# Patient Record
Sex: FEMALE | Race: WHITE | NOT HISPANIC OR LATINO | Employment: FULL TIME | URBAN - METROPOLITAN AREA
[De-identification: names, ages, dates, MRNs, and addresses within clinical notes are randomized per-mention and may not be internally consistent; named-entity substitution may affect disease eponyms.]

---

## 2017-02-08 LAB
EXTERNAL ABO GROUPING: NORMAL
EXTERNAL ANTIBODY SCREEN: NEGATIVE
EXTERNAL GTT 1 HOUR: 78
EXTERNAL HEPATITIS B SURFACE ANTIGEN: NEGATIVE
EXTERNAL HEPATITIS C AB: NORMAL
EXTERNAL RH FACTOR: POSITIVE
EXTERNAL RUBELLA QUALITATIVE: NORMAL
EXTERNAL SYPHILIS RPR SCREEN: NORMAL
HIV1 AB SPEC QL IA.RAPID: NEGATIVE

## 2017-03-31 ENCOUNTER — TRANSCRIBE ORDERS (OUTPATIENT)
Dept: ADMINISTRATIVE | Facility: HOSPITAL | Age: 37
End: 2017-03-31

## 2017-03-31 DIAGNOSIS — O09.512 AMA (ADVANCED MATERNAL AGE) PRIMIGRAVIDA 35+, SECOND TRIMESTER: Primary | ICD-10-CM

## 2017-03-31 DIAGNOSIS — Z82.79 FHX: DOWN SYNDROME: ICD-10-CM

## 2017-04-04 RX ORDER — LEVOTHYROXINE SODIUM 0.05 MG/1
TABLET ORAL
Qty: 90 TABLET | Refills: 1 | Status: SHIPPED | OUTPATIENT
Start: 2017-04-04 | End: 2018-02-09 | Stop reason: SDUPTHER

## 2017-05-03 ENCOUNTER — HOSPITAL ENCOUNTER (OUTPATIENT)
Dept: ULTRASOUND IMAGING | Facility: HOSPITAL | Age: 37
Discharge: HOME | End: 2017-05-03
Attending: OBSTETRICS & GYNECOLOGY | Admitting: OBSTETRICS & GYNECOLOGY

## 2017-05-03 DIAGNOSIS — Z82.79 FHX: DOWN SYNDROME: ICD-10-CM

## 2017-05-03 DIAGNOSIS — O09.512 AMA (ADVANCED MATERNAL AGE) PRIMIGRAVIDA 35+, SECOND TRIMESTER: ICD-10-CM

## 2017-05-03 PROCEDURE — 76817 TRANSVAGINAL US OBSTETRIC: CPT

## 2017-05-03 PROCEDURE — 76811 OB US DETAILED SNGL FETUS: CPT

## 2017-08-25 LAB — EXTERNAL GROUP B STREP ANTIGEN: NORMAL

## 2017-09-27 ENCOUNTER — ANESTHESIA (OUTPATIENT)
Dept: LABOR AND DELIVERY | Facility: HOSPITAL | Age: 37
End: 2017-09-27

## 2017-09-27 ENCOUNTER — ANESTHESIA EVENT (OUTPATIENT)
Dept: LABOR AND DELIVERY | Facility: HOSPITAL | Age: 37
End: 2017-09-27

## 2017-09-27 ENCOUNTER — HOSPITAL ENCOUNTER (INPATIENT)
Dept: LABOR AND DELIVERY | Facility: HOSPITAL | Age: 37
LOS: 2 days | Discharge: HOME | End: 2017-09-29
Attending: OBSTETRICS & GYNECOLOGY | Admitting: OBSTETRICS & GYNECOLOGY

## 2017-09-27 PROBLEM — Z34.90 PREGNANCY: Status: ACTIVE | Noted: 2017-09-27

## 2017-09-27 LAB
ABO GROUP BLD: NORMAL
ATMOSPHERIC PRESS: 747.8 MMHG
BASE EXCESS BLDCOA CALC-SCNC: -0.8 MMOL/L
BASOPHILS # BLD AUTO: 0.01 10*3/MM3 (ref 0–0.2)
BASOPHILS NFR BLD AUTO: 0.1 % (ref 0–1.5)
BDY SITE: ABNORMAL
BLD GP AB SCN SERPL QL: NEGATIVE
DEPRECATED RDW RBC AUTO: 47.1 FL (ref 37–54)
EOSINOPHIL # BLD AUTO: 0.06 10*3/MM3 (ref 0–0.7)
EOSINOPHIL NFR BLD AUTO: 0.8 % (ref 0.3–6.2)
ERYTHROCYTE [DISTWIDTH] IN BLOOD BY AUTOMATED COUNT: 12.9 % (ref 11.7–13)
HCO3 BLDCOA-SCNC: 29.1 MMOL/L (ref 22–28)
HCT VFR BLD AUTO: 39.9 % (ref 35.6–45.5)
HGB BLD-MCNC: 13.5 G/DL (ref 11.9–15.5)
IMM GRANULOCYTES # BLD: 0.03 10*3/MM3 (ref 0–0.03)
IMM GRANULOCYTES NFR BLD: 0.4 % (ref 0–0.5)
LYMPHOCYTES # BLD AUTO: 1.63 10*3/MM3 (ref 0.9–4.8)
LYMPHOCYTES NFR BLD AUTO: 21.3 % (ref 19.6–45.3)
MCH RBC QN AUTO: 33.6 PG (ref 26.9–32)
MCHC RBC AUTO-ENTMCNC: 33.8 G/DL (ref 32.4–36.3)
MCV RBC AUTO: 99.3 FL (ref 80.5–98.2)
MODALITY: ABNORMAL
MONOCYTES # BLD AUTO: 0.7 10*3/MM3 (ref 0.2–1.2)
MONOCYTES NFR BLD AUTO: 9.1 % (ref 5–12)
NEUTROPHILS # BLD AUTO: 5.23 10*3/MM3 (ref 1.9–8.1)
NEUTROPHILS NFR BLD AUTO: 68.3 % (ref 42.7–76)
PCO2 BLDCOA: 67.9 MMHG
PH BLDCOA: 7.24 PH UNITS (ref 7.18–7.34)
PLATELET # BLD AUTO: 146 10*3/MM3 (ref 140–500)
PMV BLD AUTO: 10.4 FL (ref 6–12)
PO2 BLDCOA: <25.2 MMHG (ref 12–26)
RBC # BLD AUTO: 4.02 10*6/MM3 (ref 3.9–5.2)
RH BLD: POSITIVE
SAO2 % BLDCOA: 9.8 % (ref 92–99)
WBC NRBC COR # BLD: 7.66 10*3/MM3 (ref 4.5–10.7)

## 2017-09-27 PROCEDURE — 3E0E3GC INTRODUCTION OF OTHER THERAPEUTIC SUBSTANCE INTO PRODUCTS OF CONCEPTION, PERCUTANEOUS APPROACH: ICD-10-PCS | Performed by: OBSTETRICS & GYNECOLOGY

## 2017-09-27 PROCEDURE — 86900 BLOOD TYPING SEROLOGIC ABO: CPT | Performed by: OBSTETRICS & GYNECOLOGY

## 2017-09-27 PROCEDURE — 10907ZC DRAINAGE OF AMNIOTIC FLUID, THERAPEUTIC FROM PRODUCTS OF CONCEPTION, VIA NATURAL OR ARTIFICIAL OPENING: ICD-10-PCS | Performed by: OBSTETRICS & GYNECOLOGY

## 2017-09-27 PROCEDURE — C1755 CATHETER, INTRASPINAL: HCPCS | Performed by: ANESTHESIOLOGY

## 2017-09-27 PROCEDURE — 86901 BLOOD TYPING SEROLOGIC RH(D): CPT | Performed by: OBSTETRICS & GYNECOLOGY

## 2017-09-27 PROCEDURE — 86850 RBC ANTIBODY SCREEN: CPT | Performed by: OBSTETRICS & GYNECOLOGY

## 2017-09-27 PROCEDURE — 82803 BLOOD GASES ANY COMBINATION: CPT

## 2017-09-27 PROCEDURE — 85025 COMPLETE CBC W/AUTO DIFF WBC: CPT | Performed by: OBSTETRICS & GYNECOLOGY

## 2017-09-27 PROCEDURE — 0KQM0ZZ REPAIR PERINEUM MUSCLE, OPEN APPROACH: ICD-10-PCS | Performed by: OBSTETRICS & GYNECOLOGY

## 2017-09-27 RX ORDER — OXYCODONE HYDROCHLORIDE AND ACETAMINOPHEN 5; 325 MG/1; MG/1
1 TABLET ORAL
Status: DISCONTINUED | OUTPATIENT
Start: 2017-09-27 | End: 2017-09-29 | Stop reason: HOSPADM

## 2017-09-27 RX ORDER — NALBUPHINE HCL 10 MG/ML
5 AMPUL (ML) INJECTION
Status: DISCONTINUED | OUTPATIENT
Start: 2017-09-27 | End: 2017-09-29 | Stop reason: HOSPADM

## 2017-09-27 RX ORDER — DIPHENHYDRAMINE HCL 25 MG
25 CAPSULE ORAL NIGHTLY PRN
Status: DISCONTINUED | OUTPATIENT
Start: 2017-09-27 | End: 2017-09-29 | Stop reason: HOSPADM

## 2017-09-27 RX ORDER — ONDANSETRON 4 MG/1
4 TABLET, ORALLY DISINTEGRATING ORAL EVERY 6 HOURS PRN
Status: DISCONTINUED | OUTPATIENT
Start: 2017-09-27 | End: 2017-09-29 | Stop reason: HOSPADM

## 2017-09-27 RX ORDER — ONDANSETRON 4 MG/1
4 TABLET, FILM COATED ORAL EVERY 6 HOURS PRN
Status: DISCONTINUED | OUTPATIENT
Start: 2017-09-27 | End: 2017-09-27 | Stop reason: HOSPADM

## 2017-09-27 RX ORDER — ONDANSETRON 2 MG/ML
4 INJECTION INTRAMUSCULAR; INTRAVENOUS EVERY 6 HOURS PRN
Status: DISCONTINUED | OUTPATIENT
Start: 2017-09-27 | End: 2017-09-27 | Stop reason: SDUPTHER

## 2017-09-27 RX ORDER — CARBOPROST TROMETHAMINE 250 UG/ML
250 INJECTION, SOLUTION INTRAMUSCULAR AS NEEDED
Status: DISCONTINUED | OUTPATIENT
Start: 2017-09-27 | End: 2017-09-29 | Stop reason: HOSPADM

## 2017-09-27 RX ORDER — ZOLPIDEM TARTRATE 5 MG/1
5 TABLET ORAL NIGHTLY PRN
Status: DISCONTINUED | OUTPATIENT
Start: 2017-09-27 | End: 2017-09-29 | Stop reason: HOSPADM

## 2017-09-27 RX ORDER — OXYCODONE HYDROCHLORIDE AND ACETAMINOPHEN 5; 325 MG/1; MG/1
2 TABLET ORAL EVERY 4 HOURS PRN
Status: DISCONTINUED | OUTPATIENT
Start: 2017-09-27 | End: 2017-09-27 | Stop reason: HOSPADM

## 2017-09-27 RX ORDER — ONDANSETRON 2 MG/ML
4 INJECTION INTRAMUSCULAR; INTRAVENOUS EVERY 6 HOURS PRN
Status: DISCONTINUED | OUTPATIENT
Start: 2017-09-27 | End: 2017-09-27 | Stop reason: HOSPADM

## 2017-09-27 RX ORDER — ERYTHROMYCIN 5 MG/G
OINTMENT OPHTHALMIC
Status: DISPENSED
Start: 2017-09-27 | End: 2017-09-28

## 2017-09-27 RX ORDER — ONDANSETRON 4 MG/1
4 TABLET, FILM COATED ORAL EVERY 6 HOURS PRN
Status: DISCONTINUED | OUTPATIENT
Start: 2017-09-27 | End: 2017-09-29 | Stop reason: HOSPADM

## 2017-09-27 RX ORDER — ONDANSETRON 4 MG/1
4 TABLET, ORALLY DISINTEGRATING ORAL EVERY 6 HOURS PRN
Status: DISCONTINUED | OUTPATIENT
Start: 2017-09-27 | End: 2017-09-27 | Stop reason: HOSPADM

## 2017-09-27 RX ORDER — FAMOTIDINE 20 MG/1
20 TABLET, FILM COATED ORAL EVERY 12 HOURS SCHEDULED
Status: DISCONTINUED | OUTPATIENT
Start: 2017-09-27 | End: 2017-09-27 | Stop reason: HOSPADM

## 2017-09-27 RX ORDER — ONDANSETRON 2 MG/ML
4 INJECTION INTRAMUSCULAR; INTRAVENOUS EVERY 6 HOURS PRN
Status: DISCONTINUED | OUTPATIENT
Start: 2017-09-27 | End: 2017-09-29 | Stop reason: HOSPADM

## 2017-09-27 RX ORDER — OXYCODONE HYDROCHLORIDE AND ACETAMINOPHEN 5; 325 MG/1; MG/1
1 TABLET ORAL EVERY 4 HOURS PRN
Status: DISCONTINUED | OUTPATIENT
Start: 2017-09-27 | End: 2017-09-27 | Stop reason: HOSPADM

## 2017-09-27 RX ORDER — SODIUM CHLORIDE, SODIUM LACTATE, POTASSIUM CHLORIDE, CALCIUM CHLORIDE 600; 310; 30; 20 MG/100ML; MG/100ML; MG/100ML; MG/100ML
125 INJECTION, SOLUTION INTRAVENOUS CONTINUOUS
Status: DISCONTINUED | OUTPATIENT
Start: 2017-09-27 | End: 2017-09-29 | Stop reason: HOSPADM

## 2017-09-27 RX ORDER — DOCUSATE SODIUM 100 MG/1
100 CAPSULE, LIQUID FILLED ORAL 2 TIMES DAILY
Status: DISCONTINUED | OUTPATIENT
Start: 2017-09-27 | End: 2017-09-29 | Stop reason: HOSPADM

## 2017-09-27 RX ORDER — ACETAMINOPHEN 500 MG
1000 TABLET ORAL EVERY 4 HOURS PRN
Status: DISCONTINUED | OUTPATIENT
Start: 2017-09-27 | End: 2017-09-29 | Stop reason: HOSPADM

## 2017-09-27 RX ORDER — ASPIRIN 81 MG/1
81 TABLET, CHEWABLE ORAL DAILY
COMMUNITY
End: 2017-09-29 | Stop reason: HOSPADM

## 2017-09-27 RX ORDER — LEVOTHYROXINE SODIUM 0.05 MG/1
50 TABLET ORAL
Status: DISCONTINUED | OUTPATIENT
Start: 2017-09-28 | End: 2017-09-29 | Stop reason: HOSPADM

## 2017-09-27 RX ORDER — MISOPROSTOL 200 UG/1
800 TABLET ORAL AS NEEDED
Status: DISCONTINUED | OUTPATIENT
Start: 2017-09-27 | End: 2017-09-29 | Stop reason: HOSPADM

## 2017-09-27 RX ORDER — PRENATAL VIT NO.126/IRON/FOLIC 28MG-0.8MG
1 TABLET ORAL DAILY
COMMUNITY
End: 2021-04-27

## 2017-09-27 RX ORDER — PROMETHAZINE HYDROCHLORIDE 25 MG/1
25 TABLET ORAL EVERY 6 HOURS PRN
Status: DISCONTINUED | OUTPATIENT
Start: 2017-09-27 | End: 2017-09-29 | Stop reason: HOSPADM

## 2017-09-27 RX ORDER — OXYTOCIN/RINGER'S LACTATE 10/500ML
999 PLASTIC BAG, INJECTION (ML) INTRAVENOUS ONCE
Status: DISCONTINUED | OUTPATIENT
Start: 2017-09-27 | End: 2017-09-29 | Stop reason: HOSPADM

## 2017-09-27 RX ORDER — METHYLERGONOVINE MALEATE 0.2 MG/ML
200 INJECTION INTRAVENOUS ONCE AS NEEDED
Status: DISCONTINUED | OUTPATIENT
Start: 2017-09-27 | End: 2017-09-29 | Stop reason: HOSPADM

## 2017-09-27 RX ORDER — EPHEDRINE SULFATE 50 MG/ML
5 INJECTION, SOLUTION INTRAVENOUS AS NEEDED
Status: DISCONTINUED | OUTPATIENT
Start: 2017-09-27 | End: 2017-09-27 | Stop reason: HOSPADM

## 2017-09-27 RX ORDER — TERBUTALINE SULFATE 1 MG/ML
0.25 INJECTION, SOLUTION SUBCUTANEOUS AS NEEDED
Status: DISCONTINUED | OUTPATIENT
Start: 2017-09-27 | End: 2017-09-27 | Stop reason: HOSPADM

## 2017-09-27 RX ORDER — DIPHENHYDRAMINE HCL 25 MG
25 CAPSULE ORAL NIGHTLY PRN
Status: DISCONTINUED | OUTPATIENT
Start: 2017-09-27 | End: 2017-09-27 | Stop reason: HOSPADM

## 2017-09-27 RX ORDER — PROMETHAZINE HYDROCHLORIDE 25 MG/ML
12.5 INJECTION, SOLUTION INTRAMUSCULAR; INTRAVENOUS EVERY 4 HOURS PRN
Status: DISCONTINUED | OUTPATIENT
Start: 2017-09-27 | End: 2017-09-29 | Stop reason: HOSPADM

## 2017-09-27 RX ORDER — ACETAMINOPHEN 500 MG
1000 TABLET ORAL EVERY 4 HOURS PRN
Status: DISCONTINUED | OUTPATIENT
Start: 2017-09-27 | End: 2017-09-27 | Stop reason: HOSPADM

## 2017-09-27 RX ORDER — LIDOCAINE HYDROCHLORIDE AND EPINEPHRINE 15; 5 MG/ML; UG/ML
INJECTION, SOLUTION EPIDURAL AS NEEDED
Status: DISCONTINUED | OUTPATIENT
Start: 2017-09-27 | End: 2017-09-27 | Stop reason: SURG

## 2017-09-27 RX ORDER — LIDOCAINE HYDROCHLORIDE 10 MG/ML
5 INJECTION, SOLUTION INFILTRATION; PERINEURAL AS NEEDED
Status: DISCONTINUED | OUTPATIENT
Start: 2017-09-27 | End: 2017-09-27 | Stop reason: HOSPADM

## 2017-09-27 RX ORDER — LANOLIN 100 %
OINTMENT (GRAM) TOPICAL
Status: DISCONTINUED | OUTPATIENT
Start: 2017-09-27 | End: 2017-09-29 | Stop reason: HOSPADM

## 2017-09-27 RX ORDER — MINERAL OIL
30 OIL (ML) MISCELLANEOUS AS NEEDED
Status: DISCONTINUED | OUTPATIENT
Start: 2017-09-27 | End: 2017-09-29 | Stop reason: HOSPADM

## 2017-09-27 RX ORDER — DIPHENHYDRAMINE HYDROCHLORIDE 50 MG/ML
25 INJECTION INTRAMUSCULAR; INTRAVENOUS NIGHTLY PRN
Status: DISCONTINUED | OUTPATIENT
Start: 2017-09-27 | End: 2017-09-29 | Stop reason: HOSPADM

## 2017-09-27 RX ORDER — OXYCODONE HYDROCHLORIDE AND ACETAMINOPHEN 5; 325 MG/1; MG/1
1 TABLET ORAL EVERY 4 HOURS PRN
Status: DISCONTINUED | OUTPATIENT
Start: 2017-09-27 | End: 2017-09-27 | Stop reason: SDUPTHER

## 2017-09-27 RX ORDER — PROMETHAZINE HYDROCHLORIDE 12.5 MG/1
12.5 SUPPOSITORY RECTAL EVERY 6 HOURS PRN
Status: DISCONTINUED | OUTPATIENT
Start: 2017-09-27 | End: 2017-09-27 | Stop reason: SDUPTHER

## 2017-09-27 RX ORDER — ONDANSETRON 2 MG/ML
4 INJECTION INTRAMUSCULAR; INTRAVENOUS ONCE AS NEEDED
Status: DISCONTINUED | OUTPATIENT
Start: 2017-09-27 | End: 2017-09-27 | Stop reason: HOSPADM

## 2017-09-27 RX ORDER — OXYTOCIN/RINGER'S LACTATE 10/500ML
125 PLASTIC BAG, INJECTION (ML) INTRAVENOUS CONTINUOUS PRN
Status: ACTIVE | OUTPATIENT
Start: 2017-09-27 | End: 2017-09-28

## 2017-09-27 RX ORDER — PROMETHAZINE HYDROCHLORIDE 25 MG/ML
12.5 INJECTION, SOLUTION INTRAMUSCULAR; INTRAVENOUS EVERY 6 HOURS PRN
Status: DISCONTINUED | OUTPATIENT
Start: 2017-09-27 | End: 2017-09-27 | Stop reason: SDUPTHER

## 2017-09-27 RX ORDER — IBUPROFEN 600 MG/1
600 TABLET ORAL EVERY 6 HOURS PRN
Status: DISCONTINUED | OUTPATIENT
Start: 2017-09-27 | End: 2017-09-29 | Stop reason: HOSPADM

## 2017-09-27 RX ORDER — PROMETHAZINE HYDROCHLORIDE 25 MG/ML
12.5 INJECTION, SOLUTION INTRAMUSCULAR; INTRAVENOUS EVERY 6 HOURS PRN
Status: DISCONTINUED | OUTPATIENT
Start: 2017-09-27 | End: 2017-09-29 | Stop reason: HOSPADM

## 2017-09-27 RX ORDER — OXYTOCIN/RINGER'S LACTATE 10/500ML
999 PLASTIC BAG, INJECTION (ML) INTRAVENOUS ONCE
Status: DISCONTINUED | OUTPATIENT
Start: 2017-09-27 | End: 2017-09-27 | Stop reason: HOSPADM

## 2017-09-27 RX ORDER — OXYCODONE HYDROCHLORIDE AND ACETAMINOPHEN 5; 325 MG/1; MG/1
2 TABLET ORAL EVERY 4 HOURS PRN
Status: DISCONTINUED | OUTPATIENT
Start: 2017-09-27 | End: 2017-09-27 | Stop reason: SDUPTHER

## 2017-09-27 RX ORDER — OXYCODONE HYDROCHLORIDE AND ACETAMINOPHEN 5; 325 MG/1; MG/1
2 TABLET ORAL
Status: DISCONTINUED | OUTPATIENT
Start: 2017-09-27 | End: 2017-09-29 | Stop reason: HOSPADM

## 2017-09-27 RX ORDER — ONDANSETRON 4 MG/1
4 TABLET, ORALLY DISINTEGRATING ORAL EVERY 6 HOURS PRN
Status: DISCONTINUED | OUTPATIENT
Start: 2017-09-27 | End: 2017-09-27 | Stop reason: SDUPTHER

## 2017-09-27 RX ORDER — SODIUM CHLORIDE 0.9 % (FLUSH) 0.9 %
1-10 SYRINGE (ML) INJECTION AS NEEDED
Status: DISCONTINUED | OUTPATIENT
Start: 2017-09-27 | End: 2017-09-27 | Stop reason: HOSPADM

## 2017-09-27 RX ORDER — FAMOTIDINE 10 MG/ML
20 INJECTION, SOLUTION INTRAVENOUS EVERY 12 HOURS SCHEDULED
Status: DISCONTINUED | OUTPATIENT
Start: 2017-09-27 | End: 2017-09-27 | Stop reason: HOSPADM

## 2017-09-27 RX ORDER — ACETAMINOPHEN 325 MG/1
650 TABLET ORAL EVERY 4 HOURS PRN
Status: DISCONTINUED | OUTPATIENT
Start: 2017-09-27 | End: 2017-09-29 | Stop reason: HOSPADM

## 2017-09-27 RX ORDER — FAMOTIDINE 10 MG/ML
20 INJECTION, SOLUTION INTRAVENOUS ONCE AS NEEDED
Status: DISCONTINUED | OUTPATIENT
Start: 2017-09-27 | End: 2017-09-27 | Stop reason: HOSPADM

## 2017-09-27 RX ORDER — DIPHENHYDRAMINE HYDROCHLORIDE 50 MG/ML
12.5 INJECTION INTRAMUSCULAR; INTRAVENOUS EVERY 8 HOURS PRN
Status: DISCONTINUED | OUTPATIENT
Start: 2017-09-27 | End: 2017-09-27 | Stop reason: HOSPADM

## 2017-09-27 RX ORDER — OXYTOCIN/RINGER'S LACTATE 10/500ML
125 PLASTIC BAG, INJECTION (ML) INTRAVENOUS CONTINUOUS PRN
Status: DISCONTINUED | OUTPATIENT
Start: 2017-09-27 | End: 2017-09-27 | Stop reason: HOSPADM

## 2017-09-27 RX ORDER — LORATADINE 10 MG/1
10 TABLET ORAL NIGHTLY PRN
COMMUNITY

## 2017-09-27 RX ORDER — PROMETHAZINE HYDROCHLORIDE 12.5 MG/1
12.5 SUPPOSITORY RECTAL EVERY 6 HOURS PRN
Status: DISCONTINUED | OUTPATIENT
Start: 2017-09-27 | End: 2017-09-29 | Stop reason: HOSPADM

## 2017-09-27 RX ORDER — DEXTROSE, SODIUM CHLORIDE, SODIUM LACTATE, POTASSIUM CHLORIDE, AND CALCIUM CHLORIDE 5; .6; .31; .03; .02 G/100ML; G/100ML; G/100ML; G/100ML; G/100ML
125 INJECTION, SOLUTION INTRAVENOUS CONTINUOUS
Status: DISCONTINUED | OUTPATIENT
Start: 2017-09-27 | End: 2017-09-29 | Stop reason: HOSPADM

## 2017-09-27 RX ORDER — SODIUM CHLORIDE 0.9 % (FLUSH) 0.9 %
1-10 SYRINGE (ML) INJECTION AS NEEDED
Status: DISCONTINUED | OUTPATIENT
Start: 2017-09-27 | End: 2017-09-29 | Stop reason: HOSPADM

## 2017-09-27 RX ORDER — ACETAMINOPHEN 650 MG/1
650 SUPPOSITORY RECTAL EVERY 4 HOURS PRN
Status: DISCONTINUED | OUTPATIENT
Start: 2017-09-27 | End: 2017-09-29 | Stop reason: HOSPADM

## 2017-09-27 RX ORDER — ZOLPIDEM TARTRATE 5 MG/1
5 TABLET ORAL NIGHTLY PRN
Status: DISCONTINUED | OUTPATIENT
Start: 2017-09-27 | End: 2017-09-27 | Stop reason: SDUPTHER

## 2017-09-27 RX ORDER — ZOLPIDEM TARTRATE 5 MG/1
5 TABLET ORAL NIGHTLY PRN
Status: DISCONTINUED | OUTPATIENT
Start: 2017-09-27 | End: 2017-09-27 | Stop reason: HOSPADM

## 2017-09-27 RX ORDER — OXYTOCIN/RINGER'S LACTATE 10/500ML
1-30 PLASTIC BAG, INJECTION (ML) INTRAVENOUS
Status: DISCONTINUED | OUTPATIENT
Start: 2017-09-27 | End: 2017-09-27 | Stop reason: HOSPADM

## 2017-09-27 RX ORDER — BISACODYL 10 MG
10 SUPPOSITORY, RECTAL RECTAL DAILY PRN
Status: DISCONTINUED | OUTPATIENT
Start: 2017-09-28 | End: 2017-09-29 | Stop reason: HOSPADM

## 2017-09-27 RX ORDER — ONDANSETRON 4 MG/1
4 TABLET, FILM COATED ORAL EVERY 6 HOURS PRN
Status: DISCONTINUED | OUTPATIENT
Start: 2017-09-27 | End: 2017-09-27 | Stop reason: SDUPTHER

## 2017-09-27 RX ORDER — DIPHENHYDRAMINE HYDROCHLORIDE 50 MG/ML
25 INJECTION INTRAMUSCULAR; INTRAVENOUS NIGHTLY PRN
Status: DISCONTINUED | OUTPATIENT
Start: 2017-09-27 | End: 2017-09-27 | Stop reason: HOSPADM

## 2017-09-27 RX ORDER — PROMETHAZINE HYDROCHLORIDE 12.5 MG/1
12.5 TABLET ORAL EVERY 6 HOURS PRN
Status: DISCONTINUED | OUTPATIENT
Start: 2017-09-27 | End: 2017-09-27 | Stop reason: SDUPTHER

## 2017-09-27 RX ORDER — PHYTONADIONE 1 MG/.5ML
INJECTION, EMULSION INTRAMUSCULAR; INTRAVENOUS; SUBCUTANEOUS
Status: DISPENSED
Start: 2017-09-27 | End: 2017-09-28

## 2017-09-27 RX ORDER — IBUPROFEN 800 MG/1
800 TABLET ORAL EVERY 8 HOURS
Status: DISCONTINUED | OUTPATIENT
Start: 2017-09-27 | End: 2017-09-29 | Stop reason: HOSPADM

## 2017-09-27 RX ADMIN — LIDOCAINE HYDROCHLORIDE AND EPINEPHRINE 3 ML: 15; 5 INJECTION, SOLUTION EPIDURAL at 12:33

## 2017-09-27 RX ADMIN — SODIUM CHLORIDE, POTASSIUM CHLORIDE, SODIUM LACTATE AND CALCIUM CHLORIDE 999 ML/HR: 600; 310; 30; 20 INJECTION, SOLUTION INTRAVENOUS at 12:17

## 2017-09-27 RX ADMIN — Medication 999 ML/HR: at 16:53

## 2017-09-27 RX ADMIN — SODIUM CHLORIDE, POTASSIUM CHLORIDE, SODIUM LACTATE AND CALCIUM CHLORIDE 125 ML/HR: 600; 310; 30; 20 INJECTION, SOLUTION INTRAVENOUS at 08:00

## 2017-09-27 RX ADMIN — Medication 2 MILLI-UNITS/MIN: at 08:58

## 2017-09-27 RX ADMIN — SODIUM CHLORIDE 500 ML: 9 INJECTION, SOLUTION INTRAVENOUS at 15:25

## 2017-09-27 RX ADMIN — LIDOCAINE HYDROCHLORIDE 30 ML: 20 JELLY TOPICAL at 16:20

## 2017-09-27 RX ADMIN — Medication 10 ML/HR: at 12:36

## 2017-09-27 NOTE — ANESTHESIA PREPROCEDURE EVALUATION
Anesthesia Evaluation     Patient summary reviewed and Nursing notes reviewed   no history of anesthetic complications:         Airway   Mallampati: II  TM distance: >3 FB  Neck ROM: full  no difficulty expected  Dental - normal exam     Pulmonary - negative pulmonary ROS    breath sounds clear to auscultation  (-) shortness of breath, sleep apnea, decreased breath sounds, wheezes  Cardiovascular - normal exam  Exercise tolerance: good (4-7 METS)    Rhythm: regular  Rate: normal    (-) past MI, angina, CHF, orthopnea, PND, [place], PVD      Neuro/Psych- negative ROS  (-) seizures, neuromuscular disease, TIA, CVA, dizziness/light headedness, weakness, numbness  GI/Hepatic/Renal/Endo    (+)  hypothyroidism,   (-) liver disease, diabetes    Musculoskeletal (-) negative ROS    Abdominal  - normal exam   Substance History - negative use  (-) alcohol use, drug use     OB/GYN    (+) Pregnant (),         Other - negative ROS                                       Anesthesia Plan    ASA 2     epidural     Anesthetic plan and risks discussed with patient.

## 2017-09-27 NOTE — ANESTHESIA PROCEDURE NOTES
"Labor Epidural    Patient location during procedure: OB  Performed By  Anesthesiologist: DONNA MAHONEY  Preanesthetic Checklist  Completed: patient identified, site marked, surgical consent, pre-op evaluation, timeout performed, IV checked, risks and benefits discussed and monitors and equipment checked  Additional Notes  /55  Pulse 59  Temp 36.7 °C (98 °F) (Oral)   Resp 16  Ht 68\" (172.7 cm)  Wt 176 lb (79.8 kg)  LMP 12/14/2016  SpO2 98%  Breastfeeding? Yes  BMI 26.76 kg/m2    Prep:  Pt Position:sitting  Sterile Tech:cap, gloves, mask and sterile barrier  Prep:chlorhexidine gluconate and isopropyl alcohol  Monitoring:blood pressure monitoring, continuous pulse oximetry and EKG  Epidural Block Procedure:  Approach:midline  Guidance:landmark technique and palpation technique  Location:L4-L5  Needle Type:Tuohy  Needle Gauge:17  Loss of Resistance Medium: saline  Loss of Resistance: 5cm  Cath Depth at skin:10 cm  Paresthesia: none  Aspiration:negative  Test Dose:negative  Number of Attempts: 1  Post Assessment:  Dressing:occlusive dressing applied and secured with tape  Pt Tolerance:patient tolerated the procedure well with no apparent complications  Complications:no            "

## 2017-09-28 PROBLEM — Z34.90 PREGNANCY: Status: RESOLVED | Noted: 2017-09-27 | Resolved: 2017-09-28

## 2017-09-28 LAB
BASOPHILS # BLD AUTO: 0.02 10*3/MM3 (ref 0–0.2)
BASOPHILS NFR BLD AUTO: 0.2 % (ref 0–1.5)
DEPRECATED RDW RBC AUTO: 48.1 FL (ref 37–54)
EOSINOPHIL # BLD AUTO: 0.17 10*3/MM3 (ref 0–0.7)
EOSINOPHIL NFR BLD AUTO: 1.6 % (ref 0.3–6.2)
ERYTHROCYTE [DISTWIDTH] IN BLOOD BY AUTOMATED COUNT: 13.1 % (ref 11.7–13)
HCT VFR BLD AUTO: 38.6 % (ref 35.6–45.5)
HGB BLD-MCNC: 12.9 G/DL (ref 11.9–15.5)
IMM GRANULOCYTES # BLD: 0.06 10*3/MM3 (ref 0–0.03)
IMM GRANULOCYTES NFR BLD: 0.5 % (ref 0–0.5)
LYMPHOCYTES # BLD AUTO: 1.63 10*3/MM3 (ref 0.9–4.8)
LYMPHOCYTES NFR BLD AUTO: 14.9 % (ref 19.6–45.3)
MCH RBC QN AUTO: 33.8 PG (ref 26.9–32)
MCHC RBC AUTO-ENTMCNC: 33.4 G/DL (ref 32.4–36.3)
MCV RBC AUTO: 101 FL (ref 80.5–98.2)
MONOCYTES # BLD AUTO: 1.05 10*3/MM3 (ref 0.2–1.2)
MONOCYTES NFR BLD AUTO: 9.6 % (ref 5–12)
NEUTROPHILS # BLD AUTO: 8.02 10*3/MM3 (ref 1.9–8.1)
NEUTROPHILS NFR BLD AUTO: 73.2 % (ref 42.7–76)
PLATELET # BLD AUTO: 146 10*3/MM3 (ref 140–500)
PMV BLD AUTO: 10.3 FL (ref 6–12)
RBC # BLD AUTO: 3.82 10*6/MM3 (ref 3.9–5.2)
WBC NRBC COR # BLD: 10.95 10*3/MM3 (ref 4.5–10.7)

## 2017-09-28 PROCEDURE — 85025 COMPLETE CBC W/AUTO DIFF WBC: CPT | Performed by: OBSTETRICS & GYNECOLOGY

## 2017-09-28 RX ADMIN — IBUPROFEN 800 MG: 800 TABLET ORAL at 18:23

## 2017-09-28 RX ADMIN — DOCUSATE SODIUM 100 MG: 100 CAPSULE, LIQUID FILLED ORAL at 09:00

## 2017-09-28 RX ADMIN — LEVOTHYROXINE SODIUM 50 MCG: 50 TABLET ORAL at 06:41

## 2017-09-28 RX ADMIN — DOCUSATE SODIUM 100 MG: 100 CAPSULE, LIQUID FILLED ORAL at 18:23

## 2017-09-28 RX ADMIN — IBUPROFEN 800 MG: 800 TABLET ORAL at 01:09

## 2017-09-28 RX ADMIN — ACETAMINOPHEN 650 MG: 325 TABLET ORAL at 09:00

## 2017-09-28 RX ADMIN — IBUPROFEN 800 MG: 800 TABLET ORAL at 09:00

## 2017-09-28 RX ADMIN — ACETAMINOPHEN 650 MG: 325 TABLET ORAL at 15:17

## 2017-09-29 VITALS
HEIGHT: 68 IN | BODY MASS INDEX: 26.67 KG/M2 | WEIGHT: 176 LBS | DIASTOLIC BLOOD PRESSURE: 71 MMHG | SYSTOLIC BLOOD PRESSURE: 113 MMHG | OXYGEN SATURATION: 98 % | HEART RATE: 67 BPM | TEMPERATURE: 98.4 F | RESPIRATION RATE: 18 BRPM

## 2017-09-29 RX ORDER — IBUPROFEN 800 MG/1
800 TABLET ORAL EVERY 8 HOURS PRN
Qty: 30 TABLET | Refills: 3 | Status: SHIPPED | OUTPATIENT
Start: 2017-09-29 | End: 2017-11-08

## 2017-09-29 RX ORDER — OXYCODONE HYDROCHLORIDE AND ACETAMINOPHEN 5; 325 MG/1; MG/1
2 TABLET ORAL
Qty: 24 TABLET | Refills: 0 | Status: SHIPPED | OUTPATIENT
Start: 2017-09-29 | End: 2017-10-01

## 2017-09-29 RX ADMIN — DOCUSATE SODIUM 100 MG: 100 CAPSULE, LIQUID FILLED ORAL at 08:57

## 2017-09-29 RX ADMIN — IBUPROFEN 800 MG: 800 TABLET ORAL at 05:47

## 2017-09-29 RX ADMIN — LEVOTHYROXINE SODIUM 50 MCG: 50 TABLET ORAL at 05:48

## 2018-02-09 RX ORDER — LEVOTHYROXINE SODIUM 0.05 MG/1
TABLET ORAL
Qty: 90 TABLET | Refills: 0 | Status: SHIPPED | OUTPATIENT
Start: 2018-02-09 | End: 2018-05-25 | Stop reason: SDUPTHER

## 2018-05-25 RX ORDER — LEVOTHYROXINE SODIUM 0.05 MG/1
50 TABLET ORAL DAILY
Qty: 90 TABLET | Refills: 0 | Status: SHIPPED | OUTPATIENT
Start: 2018-05-25 | End: 2021-04-27

## 2019-09-11 ENCOUNTER — APPOINTMENT (OUTPATIENT)
Dept: WOMENS IMAGING | Facility: HOSPITAL | Age: 39
End: 2019-09-11

## 2019-09-11 PROCEDURE — 77063 BREAST TOMOSYNTHESIS BI: CPT | Performed by: RADIOLOGY

## 2019-09-11 PROCEDURE — 77067 SCR MAMMO BI INCL CAD: CPT | Performed by: RADIOLOGY

## 2020-11-20 ENCOUNTER — APPOINTMENT (OUTPATIENT)
Dept: WOMENS IMAGING | Facility: HOSPITAL | Age: 40
End: 2020-11-20

## 2020-11-20 PROCEDURE — 77067 SCR MAMMO BI INCL CAD: CPT | Performed by: RADIOLOGY

## 2021-03-18 ENCOUNTER — APPOINTMENT (OUTPATIENT)
Dept: GENERAL RADIOLOGY | Facility: HOSPITAL | Age: 41
End: 2021-03-18

## 2021-03-18 ENCOUNTER — HOSPITAL ENCOUNTER (EMERGENCY)
Facility: HOSPITAL | Age: 41
Discharge: HOME | End: 2021-03-18
Attending: EMERGENCY MEDICINE | Admitting: EMERGENCY MEDICINE

## 2021-03-18 VITALS
SYSTOLIC BLOOD PRESSURE: 124 MMHG | TEMPERATURE: 97.9 F | BODY MASS INDEX: 19.7 KG/M2 | HEIGHT: 68 IN | RESPIRATION RATE: 16 BRPM | DIASTOLIC BLOOD PRESSURE: 70 MMHG | HEART RATE: 84 BPM | WEIGHT: 130 LBS | OXYGEN SATURATION: 99 %
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
## 2021-03-18 DIAGNOSIS — S91.051A DOG BITE OF ANKLE, RIGHT, INITIAL ENCOUNTER: Primary | ICD-10-CM

## 2021-03-18 DIAGNOSIS — S91.052A DOG BITE OF ANKLE, LEFT, INITIAL ENCOUNTER: ICD-10-CM

## 2021-03-18 DIAGNOSIS — S91.051A OPEN WOUND OF RIGHT ANKLE DUE TO DOG BITE: ICD-10-CM
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
## 2021-03-18 PROCEDURE — 99283 EMERGENCY DEPT VISIT LOW MDM: CPT

## 2021-03-18 PROCEDURE — 73610 X-RAY EXAM OF ANKLE: CPT

## 2021-03-18 RX ORDER — AMOXICILLIN AND CLAVULANATE POTASSIUM 875; 125 MG/1; MG/1
1 TABLET, FILM COATED ORAL ONCE
Status: COMPLETED | OUTPATIENT
Start: 2021-03-18 | End: 2021-03-18

## 2021-03-18 RX ORDER — IBUPROFEN 800 MG/1
800 TABLET ORAL EVERY 8 HOURS PRN
Qty: 30 TABLET | Refills: 0 | Status: SHIPPED | OUTPATIENT
Start: 2021-03-18 | End: 2021-04-29 | Stop reason: HOSPADM

## 2021-03-18 RX ORDER — AMOXICILLIN AND CLAVULANATE POTASSIUM 875; 125 MG/1; MG/1
1 TABLET, FILM COATED ORAL EVERY 12 HOURS
Qty: 14 TABLET | Refills: 0 | Status: SHIPPED | OUTPATIENT
Start: 2021-03-18 | End: 2021-03-25

## 2021-03-18 RX ORDER — LORAZEPAM 1 MG/1
1 TABLET ORAL ONCE
Status: COMPLETED | OUTPATIENT
Start: 2021-03-18 | End: 2021-03-18

## 2021-03-18 RX ORDER — HYDROXYZINE HYDROCHLORIDE 25 MG/1
25 TABLET, FILM COATED ORAL 3 TIMES DAILY PRN
Qty: 30 TABLET | Refills: 0 | Status: SHIPPED | OUTPATIENT
Start: 2021-03-18 | End: 2021-06-15

## 2021-03-18 RX ORDER — IBUPROFEN 800 MG/1
800 TABLET ORAL ONCE
Status: COMPLETED | OUTPATIENT
Start: 2021-03-18 | End: 2021-03-18

## 2021-03-18 RX ORDER — LIDOCAINE HYDROCHLORIDE AND EPINEPHRINE 10; 10 MG/ML; UG/ML
10 INJECTION, SOLUTION INFILTRATION; PERINEURAL ONCE
Status: COMPLETED | OUTPATIENT
Start: 2021-03-18 | End: 2021-03-18

## 2021-03-18 RX ADMIN — LIDOCAINE HYDROCHLORIDE,EPINEPHRINE BITARTRATE 10 ML: 10; .01 INJECTION, SOLUTION INFILTRATION; PERINEURAL at 16:01

## 2021-03-18 RX ADMIN — IBUPROFEN 800 MG: 800 TABLET, FILM COATED ORAL at 15:34

## 2021-03-18 RX ADMIN — AMOXICILLIN AND CLAVULANATE POTASSIUM 1 TABLET: 875; 125 TABLET, FILM COATED ORAL at 17:07

## 2021-03-18 RX ADMIN — LORAZEPAM 1 MG: 1 TABLET ORAL at 15:34

## 2021-03-18 NOTE — ED PROVIDER NOTES
EMERGENCY DEPARTMENT ENCOUNTER    Room Number:  02/02  Date seen:  3/23/2021  Time seen: 14:57 EDT  PCP: Lavern Naranjo MD  Historian: Patient, EMS    HPI:  Chief complaint: Fall dog bite  A complete HPI/ROS/PMH/PSH/SH/FH are unobtainable due to: not Applicable  Context:Nicole Birch is a 40 y.o. female who presents to the ED with c/o moderate animal bites to BLE from an unprovoked attack by pit bull just PTA.  It is not made better/worse by anything and was dressed PTA per EMS.  She states she was walking in her neighborhood and she usually cuts through [facility] apartments and that she saw the pitbull out of corner of her eye coming right at her and the owner was unable to control the dog.  She reports the dog came running towards her from the [employer] [facility].  Her Tdap is up to date 3 years ago when she gave birth here.  [place] animal control, police, fire and EMS were all at the scene.  The animal also bit someone else who was brought to the hospital.  She has no loss of movement or sensation in the areas of bites.     Patient was placed in face mask in first look. Patient was wearing facemask when I entered the room and throughout our encounter. I wore full protective equipment throughout this patient encounter including a face mask, eye shield and gloves. Hand hygiene/washing of hands was performed before donning protective equipment and after removal when leaving the room.    MEDICAL RECORD REVIEW    ALLERGIES  Patient has no known allergies.    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   • Hypothyroidism 07/26/2016   • Vitamin D deficiency disease 07/26/2016     Resolved Ambulatory Problems     Diagnosis Date Noted   • Pregnancy 09/27/2017     No Additional Past Medical History       PAST SURGICAL HISTORY  Past Surgical History:   Procedure Laterality Date   • WISDOM TOOTH EXTRACTION         FAMILY HISTORY  Family History   Problem  Relation Age of Onset   • Cancer Mother 67        lung    • Heart disease Father        SOCIAL HISTORY  Social History     Socioeconomic History   • Marital status:      Spouse name: Not on file   • Number of children: Not on file   • Years of education: Not on file   • Highest education level: Not on file   Tobacco Use   • Smoking status: Never Smoker   • Smokeless tobacco: Never Used   Substance and Sexual Activity   • Alcohol use: Yes     Comment: occasionally, prior to pregnancy   • Drug use: No   • Sexual activity: Defer     Partners: Male       REVIEW OF SYSTEMS  Review of Systems    All systems reviewed and negative except for those discussed in HPI.     PHYSICAL EXAM    ED Triage Vitals [03/18/21 1403]   Temp Heart Rate Resp BP SpO2   97.9 °F (36.6 °C) 84 16 131/80 99 %      Temp src Heart Rate Source Patient Position BP Location FiO2 (%)   Tympanic Monitor Lying -- --     Physical Exam  Cardiovascular:      Pulses:           Dorsalis pedis pulses are 2+ on the right side and 2+ on the left side.        Posterior tibial pulses are 2+ on the right side and 2+ on the left side.   Musculoskeletal:      Right lower leg: Normal. No bony tenderness.      Left lower leg: Laceration present. No bony tenderness.      Right foot: Normal range of motion. No deformity or foot drop.      Left foot: Normal range of motion. No deformity or foot drop.        Legs:         Feet:       Comments: Barrow test is  Negative.  No loss of sensory function to BLE in areas of dog bites and lacerations.    Feet:      Right foot:      Toenail Condition: Right toenails are normal.      Left foot:      Toenail Condition: Left toenails are normal.      Comments: Pt with multiple linear right lower extremity lacerations on  RLE.  See laceration repair notes.         I have reviewed the triage vital signs and nursing notes.      GENERAL:  Distressed, anxious and tearful  HENT: nares patent  EYES: no scleral icterus  NECK: no ROM  limitations  CV: regular rhythm, regular rate, no murmur, no rubs, no gallups  RESPIRATORY: normal effort, CTAB  ABDOMEN: soft  : deferred  MUSCULOSKELETAL: see documentation above  NEURO: alert, moves all extremities, follows commands  SKIN: warm, dry    Laceration Repair    Date/Time: 3/18/2021 4:20 PM  Performed by: Susan Adams APRN  Authorized by: Rubens Aguiar MD     Consent:     Consent obtained:  Verbal    Consent given by:  Patient    Risks discussed:  Infection, pain, poor cosmetic result, poor wound healing and tendon damage    Alternatives discussed:  No treatment  Anesthesia (see MAR for exact dosages):     Anesthesia method:  Local infiltration    Local anesthetic:  Lidocaine 1% WITH epi  Laceration details:     Location:  Foot    Foot location:  R achilles    Length (cm):  3  Repair type:     Repair type:  Simple  Pre-procedure details:     Preparation:  Patient was prepped and draped in usual sterile fashion and imaging obtained to evaluate for foreign bodies  Exploration:     Wound extent: foreign bodies/material      Wound extent: no muscle damage noted, no nerve damage noted, no tendon damage noted and no underlying fracture noted      Foreign bodies/material:  Dirt    Contaminated: yes    Treatment:     Area cleansed with:  Shur-Clens    Amount of cleaning:  Extensive    Irrigation solution:  Sterile saline    Irrigation volume:  1000    Irrigation method:  Pressure wash    Visualized foreign bodies/material removed: yes    Skin repair:     Repair method:  Sutures    Suture size:  4-0    Suture material:  Nylon    Suture technique:  Simple interrupted    Number of sutures:  3  Approximation:     Approximation:  Loose  Post-procedure details:     Dressing:  Antibiotic ointment, non-adherent dressing and bulky dressing    Patient tolerance of procedure:  Tolerated well, no immediate complications    Laceration Repair    Date/Time: 3/18/2021 5:30 PM  Performed by: Susan Adams  TIMOTHY  Authorized by: Rubens Aguiar MD     Consent:     Consent obtained:  Verbal    Consent given by:  Patient    Risks discussed:  Infection, pain, poor cosmetic result, poor wound healing, retained foreign body and tendon damage    Alternatives discussed:  No treatment  Anesthesia (see MAR for exact dosages):     Anesthesia method:  Local infiltration    Local anesthetic:  Lidocaine 1% WITH epi  Laceration details:     Location:  Foot    Foot location:  R ankle    Length (cm):  2  Repair type:     Repair type:  Simple  Pre-procedure details:     Preparation:  Patient was prepped and draped in usual sterile fashion and imaging obtained to evaluate for foreign bodies  Exploration:     Wound exploration: wound explored through full range of motion and entire depth of wound probed and visualized      Wound extent: foreign bodies/material      Wound extent: no muscle damage noted, no nerve damage noted, no tendon damage noted and no underlying fracture noted      Foreign bodies/material:  Dirt     Contaminated: yes    Treatment:     Area cleansed with:  Shnazia-Clens    Amount of cleaning:  Extensive    Irrigation solution:  Sterile saline    Irrigation volume:  1000    Irrigation method:  Pressure wash    Visualized foreign bodies/material removed: yes    Skin repair:     Repair method:  Sutures    Suture size:  4-0    Suture material:  Nylon    Suture technique:  Simple interrupted    Number of sutures:  2  Approximation:     Approximation:  Loose  Post-procedure details:     Dressing:  Antibiotic ointment, non-adherent dressing and bulky dressing    Patient tolerance of procedure:  Tolerated well, no immediate complications  Laceration Repair    Date/Time: 3/18/2021 5:31 PM  Performed by: Susan Adams APRN  Authorized by: Rubens Aguiar MD     Consent:     Consent obtained:  Verbal    Consent given by:  Patient    Risks discussed:  Infection, pain, poor cosmetic result, poor wound healing and retained foreign  body    Alternatives discussed:  No treatment  Anesthesia (see MAR for exact dosages):     Anesthesia method:  Local infiltration    Local anesthetic:  Lidocaine 1% WITH epi  Laceration details:     Location:  Foot    Foot location:  R ankle    Length (cm):  2  Repair type:     Repair type:  Simple  Exploration:     Wound exploration: wound explored through full range of motion and entire depth of wound probed and visualized      Wound extent: foreign bodies/material      Wound extent: no muscle damage noted, no nerve damage noted, no tendon damage noted and no underlying fracture noted      Contaminated: yes    Treatment:     Area cleansed with:  Cricket    Amount of cleaning:  Standard    Irrigation solution:  Sterile saline    Irrigation volume:  1000    Irrigation method:  Pressure wash    Visualized foreign bodies/material removed: yes    Skin repair:     Repair method:  Sutures    Number of sutures:  2  Approximation:     Approximation:  Loose  Post-procedure details:     Dressing:  Antibiotic ointment, non-adherent dressing and bulky dressing    Patient tolerance of procedure:  Tolerated well, no immediate complications  Laceration Repair    Date/Time: 3/18/2021 5:32 PM  Performed by: Susan Adams APRN  Authorized by: Rubens Aguiar MD     Consent:     Consent obtained:  Verbal    Consent given by:  Patient    Risks discussed:  Infection, pain, poor cosmetic result and poor wound healing    Alternatives discussed:  No treatment  Anesthesia (see MAR for exact dosages):     Anesthesia method:  Local infiltration  Laceration details:     Location:  Foot    Foot location:  R ankle (right ankle medial)    Length (cm):  3  Repair type:     Repair type:  Simple  Pre-procedure details:     Preparation:  Patient was prepped and draped in usual sterile fashion and imaging obtained to evaluate for foreign bodies  Exploration:     Wound exploration: wound explored through full range of motion and entire depth  of wound probed and visualized      Wound extent: foreign bodies/material      Wound extent: no muscle damage noted, no nerve damage noted, no tendon damage noted and no underlying fracture noted      Contaminated: yes    Treatment:     Area cleansed with:  Cricket    Amount of cleaning:  Standard    Irrigation solution:  Sterile saline    Irrigation volume:  1000    Irrigation method:  Pressure wash    Visualized foreign bodies/material removed: yes    Skin repair:     Repair method:  Sutures    Suture size:  4-0    Suture material:  Nylon    Suture technique:  Simple interrupted    Number of sutures:  3  Approximation:     Approximation:  Loose  Post-procedure details:     Dressing:  Antibiotic ointment, bulky dressing and non-adherent dressing    Patient tolerance of procedure:  Tolerated well, no immediate complications  Laceration Repair    Date/Time: 3/18/2021 5:34 PM  Performed by: Susan Adams APRN  Authorized by: Rubens Aguiar MD     Consent:     Consent obtained:  Verbal    Consent given by:  Patient    Risks discussed:  Infection, pain, poor cosmetic result and poor wound healing    Alternatives discussed:  No treatment  Anesthesia (see MAR for exact dosages):     Anesthesia method:  Local infiltration    Local anesthetic:  Lidocaine 1% WITH epi  Laceration details:     Location:  Foot    Foot location:  R ankle (right ankle lateral)    Length (cm):  2  Repair type:     Repair type:  Simple  Pre-procedure details:     Preparation:  Patient was prepped and draped in usual sterile fashion and imaging obtained to evaluate for foreign bodies  Exploration:     Wound exploration: wound explored through full range of motion and entire depth of wound probed and visualized      Wound extent: foreign bodies/material      Wound extent: no nerve damage noted, no tendon damage noted and no underlying fracture noted      Foreign bodies/material:  Dirt    Contaminated: yes    Treatment:     Area cleansed  with:  Cricket    Amount of cleaning:  Standard    Irrigation solution:  Sterile saline    Irrigation volume:  1000    Irrigation method:  Pressure wash    Visualized foreign bodies/material removed: yes    Skin repair:     Repair method:  Sutures    Suture size:  4-0    Suture material:  Nylon    Number of sutures:  1  Approximation:     Approximation:  Loose  Post-procedure details:     Dressing:  Antibiotic ointment, bulky dressing and non-adherent dressing    Patient tolerance of procedure:  Tolerated well, no immediate complications  Comments:      All lacerations on RLE irrigated copiously with total of 2500 NS.  Debris removed included dirt fragments.  Pt placed on antibiotics. To follow up with  Foot/ankle surgeon on Monday            RADIOLOGY RESULTS  XR Ankle 3+ View Right   Final Result   Soft tissue wound in the posterior aspect of the distal   lower leg/upper ankle. No fracture or radiopaque foreign body is   present.       This report was finalized on 3/18/2021 3:44 PM by Dr. Thuan Barragan M.D.                PROGRESS, DATA ANALYSIS, CONSULTS AND MEDICAL DECISION MAKING  All labs have been independently reviewed by me.  All radiology studies have been reviewed by me and discussed with radiologist dictating the report.  EKG's independently viewed and interpreted by me unless stated otherwise. Discussion below represents my analysis of pertinent findings related to patient's condition, differential diagnosis, treatment plan and final disposition.     ED Course as of Mar 23 0002   Thu Mar 18, 2021   1513 I discussed dog bite with Officer Formaribeth of [facility] animal control.  The owner of the dog cannot provide rabies vaccine verification.  The dog has been confiscated and will be held at the animal control for 10 days.    [EW]   1524 Discussion with Mireille Pharm.D.  If the animal can be contained for the 10 days then the patient does not need immediate rabies vaccine.  This was also  discussed with officer of [facility] animal control who states the animal has already been detained and will be watched for the next 10 days.  Animal control has this patient's information and will call and update her.  The patient also has the officers contact information as well.    [EW]   1637 I discussed patient's right ankle dog bites and injuries and x-ray imaging with Dr. Wills, foot and ankle surgeon.  He would like to see her for follow-up on Monday.  I will place her in a postop boot for the next several days just to help provide stability and keep the dressings intact.    [EW]      ED Course User Index  [EW] Susan Adams, TIMOTHY     DDX: multiple dog bites to RLE and left lateral lower leg, tendon injury, underlying fracture    MDM: Imaging obtained to the right lower extremity with no acute fracture.  Patient has no loss of Achilles function noted on exam.  Patient to follow-up with Dr. Rafael Wills on Monday who is a foot and ankle surgeon.  Antibiotics started in the emergency department.  Wounds all irrigated copiously prior to loose closure.  I placed the patient in a walking boot to help protect these areas.  She had no loss of sensory or motor function.  Animal control is involved in the case and will communicate with patient regarding rabies.    Reviewed pt's history and workup with Dr. Aguiar.  After a bedside evaluation, Dr. Aguiar agrees with the plan of care.    The patient's history, physical exam, and lab findings were discussed with the physician, who also performed a face to face history and physical exam.  I discussed all results and noted any abnormalities with patient.  Discussed absoute need to recheck abnormalities with their family physician.  I answered any of the patient's questions.  Discussed plan for discharge, as there is no emergent indication for admission.  Pt is agreeable and understands need for follow up and repeat testing.  Pt is aware that discharge does not mean  "that nothing is wrong but it indicates no emergency is present and they must continue care with their family physician.  Pt is discharged with instructions to follow up with primary care doctor to have their blood pressure rechecked.           Disposition vitals:  /70   Pulse 84   Temp 97.9 °F (36.6 °C) (Tympanic)   Resp 16   Ht 172.7 cm (68\")   Wt 59 kg (130 lb)   SpO2 99%   BMI 19.77 kg/m²       DIAGNOSIS  Final diagnoses:   Dog bite of ankle, left, initial encounter   Open wound of right ankle due to dog bite   Dog bite of ankle, right, initial encounter       FOLLOW UP   Eligio Wills, DPSAMUEL  [address] [facility] [address]  [place] [id number] [phone]    Call   to be seen on Monday    Mayda Pichardo, APRN  [address] [facility] [address]  [facility] [id number] [phone]      call to establish primary care; make sure she takes [employer] which I bet she does         Susan Adams, APRKEKE  03/18/21 1738       Susan Adams, APRN  03/23/21 0002    "

## 2021-03-18 NOTE — DISCHARGE INSTRUCTIONS
Keep in contact with Officer Luz of [facility] animal department      1.  Keep initial dressing in place for 24 hours if able.  (if blood seeps through, then remove this dressing, wash gently with antibacterial soap and pat dry)    2.  After initial 24 hours wash the wound twice a day with antibacterial soap and apply thin film of over the counter triple antibiotic ointment (bacitracin or neosporin ointment)    3.  Cover with bandaid while at work    4.  Sutures out in 7-10 days.    Although you are being discharged from the ED today, I encourage you to return for worsening symptoms. Things can, and do, change such that treatment at home with medication may not be adequate. Specifically I recommend returning for chest pain or discomfort, difficulty breathing, persistent vomiting or difficulty holding down liquids or medications, fever > 102.0 F, or any other worsening or alarming symptoms.     Rest. Drink plenty of fluids.  Follow up with PCP or provider listed for further evaluation and management.  Follow up with primary care provider for further management and to have blood pressure rechecked.  Take all medications as prescribed.

## 2021-03-18 NOTE — ED PROVIDER NOTES
MD ATTESTATION NOTE    The EVANGELIST and I have discussed this patient's history, physical exam, and treatment plan.  I have reviewed the documentation and personally had a face to face interaction with the patient. I affirm the documentation and agree with the treatment and plan.  The attached note describes my personal findings.      Nicole Birch is a 40 y.o. female who presents to the ED c/o of bite.  She reports that just prior to arrival she was bitten in her bilateral lower extremities by a dog.  She reports animal control was called and was able to apprehend the dog.  She reports lacerations to her right lower extremity and wound to her left lower extremity.  She states her last tetanus shot was 3 years ago.  She denies other injury.      On exam:  GENERAL: Awake, alert, no acute distress  SKIN: Warm, dry  HENT: Normocephalic, atraumatic  EYES: no scleral icterus  MUSCULOSKELETAL: no deformity, multiple puncture wounds and lacerations to the right posterior ankle.  No active bleeding.  Sensation and motor intact.  Abrasion to the left calf.  No deformities.  NEURO: alert, moves all extremities, follows commands    Labs  No results found for this or any previous visit (from the past 24 hour(s)).    Radiology  No Radiology Exams Resulted Within Past 24 Hours    Medical Decision Making:  ED Course as of Mar 18 1543   Thu Mar 18, 2021   1513 I discussed dog bite with Officer Forner of [facility] animal control.  The owner of the dog cannot provide rabies vaccine verification.  The dog has been confiscated and will be held at the animal [facility] for 10 days.    [EW]   1524 Discussion with Mireille, Pharm.D.  If the animal can be contained for the 10 days then the patient does not need immediate rabies vaccine.  This was also discussed with officer of [facility] animal control who states the animal has already been detained and will be watched for the next 10 days.  [employer] has this patient's  information and will call and update her.  The patient also has the officers contact information as well.    [EW]      ED Course User Index  [EW] Susan Adams APRN       Plan thorough irrigation of the wounds, loose closure of the wounds.  Plan x-ray of the right ankle.  Plan Augmentin coverage for dog bite.    PPE: Both the patient and I wore a surgical mask throughout the entire patient encounter. I wore protective goggles.     Diagnosis  Final diagnoses:   None        Rubens Aguiar MD  03/18/21 2736

## 2021-03-18 NOTE — ED TRIAGE NOTES
Pt arrives via EMS following a dog bite to the right ankle and left calf. States that she was walking when another person's dog ran up and bit her. EMS reports animal control was on scene. Patient masked at arrival and triage staff wore all appropriate PPE during entire encounter with patient.

## 2021-04-02 ENCOUNTER — NURSE TRIAGE (OUTPATIENT)
Dept: CALL CENTER | Facility: HOSPITAL | Age: 41
End: 2021-04-02
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
## 2021-04-26 PROBLEM — S91.052A DOG BITE OF ANKLE, LEFT, INITIAL ENCOUNTER: Status: ACTIVE | Noted: 2021-04-26

## 2021-04-27 ENCOUNTER — LAB (OUTPATIENT)
Dept: LAB | Facility: HOSPITAL | Age: 41
End: 2021-04-27

## 2021-04-27 ENCOUNTER — TRANSCRIBE ORDERS (OUTPATIENT)
Dept: ADMINISTRATIVE | Facility: HOSPITAL | Age: 41
End: 2021-04-27

## 2021-04-27 ENCOUNTER — ANESTHESIA EVENT (OUTPATIENT)
Dept: PERIOP | Facility: HOSPITAL | Age: 41
End: 2021-04-27

## 2021-04-27 DIAGNOSIS — S91.051A OPEN WOUND OF RIGHT ANKLE DUE TO DOG BITE: Primary | ICD-10-CM
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
## 2021-04-27 DIAGNOSIS — S91.051A OPEN WOUND OF RIGHT ANKLE DUE TO DOG BITE: ICD-10-CM

## 2021-04-27 DIAGNOSIS — S91.051A DOG BITE OF ANKLE, RIGHT, INITIAL ENCOUNTER: Primary | ICD-10-CM
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
## 2021-04-27 LAB
ALBUMIN SERPL-MCNC: 4.3 G/DL (ref 3.5–5.2)
ALBUMIN/GLOB SERPL: 1.9 G/DL
ALP SERPL-CCNC: 63 U/L (ref 39–117)
ALT SERPL W P-5'-P-CCNC: 28 U/L (ref 1–33)
ANION GAP SERPL CALCULATED.3IONS-SCNC: 11.1 MMOL/L (ref 5–15)
AST SERPL-CCNC: 22 U/L (ref 1–32)
BASOPHILS # BLD AUTO: 0.06 10*3/MM3 (ref 0–0.2)
BASOPHILS NFR BLD AUTO: 1.1 % (ref 0–1.5)
BILIRUB SERPL-MCNC: 0.7 MG/DL (ref 0–1.2)
BUN SERPL-MCNC: 9 MG/DL (ref 6–20)
BUN/CREAT SERPL: 13 (ref 7–25)
CALCIUM SPEC-SCNC: 9.2 MG/DL (ref 8.6–10.5)
CHLORIDE SERPL-SCNC: 102 MMOL/L (ref 98–107)
CO2 SERPL-SCNC: 26.9 MMOL/L (ref 22–29)
CREAT SERPL-MCNC: 0.69 MG/DL (ref 0.57–1)
DEPRECATED RDW RBC AUTO: 40.6 FL (ref 37–54)
EOSINOPHIL # BLD AUTO: 0.19 10*3/MM3 (ref 0–0.4)
EOSINOPHIL NFR BLD AUTO: 3.4 % (ref 0.3–6.2)
ERYTHROCYTE [DISTWIDTH] IN BLOOD BY AUTOMATED COUNT: 11.8 % (ref 12.3–15.4)
GFR SERPL CREATININE-BSD FRML MDRD: 93 ML/MIN/1.73
GLOBULIN UR ELPH-MCNC: 2.3 GM/DL
GLUCOSE SERPL-MCNC: 84 MG/DL (ref 65–99)
HCT VFR BLD AUTO: 42.6 % (ref 34–46.6)
HGB BLD-MCNC: 14.1 G/DL (ref 12–15.9)
IMM GRANULOCYTES # BLD AUTO: 0.01 10*3/MM3 (ref 0–0.05)
IMM GRANULOCYTES NFR BLD AUTO: 0.2 % (ref 0–0.5)
LYMPHOCYTES # BLD AUTO: 1.97 10*3/MM3 (ref 0.7–3.1)
LYMPHOCYTES NFR BLD AUTO: 35.2 % (ref 19.6–45.3)
MCH RBC QN AUTO: 30.9 PG (ref 26.6–33)
MCHC RBC AUTO-ENTMCNC: 33.1 G/DL (ref 31.5–35.7)
MCV RBC AUTO: 93.2 FL (ref 79–97)
MONOCYTES # BLD AUTO: 0.53 10*3/MM3 (ref 0.1–0.9)
MONOCYTES NFR BLD AUTO: 9.5 % (ref 5–12)
NEUTROPHILS NFR BLD AUTO: 2.84 10*3/MM3 (ref 1.7–7)
NEUTROPHILS NFR BLD AUTO: 50.6 % (ref 42.7–76)
NRBC BLD AUTO-RTO: 0 /100 WBC (ref 0–0.2)
PLATELET # BLD AUTO: 203 10*3/MM3 (ref 140–450)
PMV BLD AUTO: 9.3 FL (ref 6–12)
POTASSIUM SERPL-SCNC: 4 MMOL/L (ref 3.5–5.2)
PROT SERPL-MCNC: 6.6 G/DL (ref 6–8.5)
RBC # BLD AUTO: 4.57 10*6/MM3 (ref 3.77–5.28)
SARS-COV-2 ORF1AB RESP QL NAA+PROBE: NOT DETECTED
SODIUM SERPL-SCNC: 140 MMOL/L (ref 136–145)
WBC # BLD AUTO: 5.6 10*3/MM3 (ref 3.4–10.8)

## 2021-04-27 PROCEDURE — 85025 COMPLETE CBC W/AUTO DIFF WBC: CPT

## 2021-04-27 PROCEDURE — U0004 COV-19 TEST NON-CDC HGH THRU: HCPCS

## 2021-04-27 PROCEDURE — 80053 COMPREHEN METABOLIC PANEL: CPT

## 2021-04-27 PROCEDURE — 36415 COLL VENOUS BLD VENIPUNCTURE: CPT

## 2021-04-27 PROCEDURE — C9803 HOPD COVID-19 SPEC COLLECT: HCPCS

## 2021-04-28 ENCOUNTER — ANESTHESIA (OUTPATIENT)
Dept: PERIOP | Facility: HOSPITAL | Age: 41
End: 2021-04-28

## 2021-04-28 ENCOUNTER — HOSPITAL ENCOUNTER (OUTPATIENT)
Facility: HOSPITAL | Age: 41
Discharge: HOME | End: 2021-04-29
Attending: ORTHOPAEDIC SURGERY | Admitting: ORTHOPAEDIC SURGERY

## 2021-04-28 ENCOUNTER — APPOINTMENT (OUTPATIENT)
Dept: GENERAL RADIOLOGY | Facility: HOSPITAL | Age: 41
End: 2021-04-28
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
## 2021-04-28 DIAGNOSIS — S91.052A DOG BITE OF ANKLE, LEFT, INITIAL ENCOUNTER: ICD-10-CM

## 2021-04-28 LAB
B-HCG UR QL: NEGATIVE
INTERNAL NEGATIVE CONTROL: NEGATIVE
INTERNAL POSITIVE CONTROL: POSITIVE
Lab: NORMAL

## 2021-04-28 PROCEDURE — 25010000002 FENTANYL CITRATE (PF) 100 MCG/2ML SOLUTION: Performed by: ANESTHESIOLOGY

## 2021-04-28 PROCEDURE — 63710000001 DIPHENHYDRAMINE PER 50 MG: Performed by: NURSE PRACTITIONER

## 2021-04-28 PROCEDURE — 87070 CULTURE OTHR SPECIMN AEROBIC: CPT | Performed by: ORTHOPAEDIC SURGERY

## 2021-04-28 PROCEDURE — 25010000002 VANCOMYCIN 1 G RECONSTITUTED SOLUTION: Performed by: ORTHOPAEDIC SURGERY

## 2021-04-28 PROCEDURE — 25010000002 DROPERIDOL PER 5 MG: Performed by: ANESTHESIOLOGY

## 2021-04-28 PROCEDURE — 87015 SPECIMEN INFECT AGNT CONCNTJ: CPT | Performed by: ORTHOPAEDIC SURGERY

## 2021-04-28 PROCEDURE — 25010000002 MIDAZOLAM PER 1 MG: Performed by: ANESTHESIOLOGY

## 2021-04-28 PROCEDURE — 25010000002 ONDANSETRON PER 1 MG: Performed by: ANESTHESIOLOGY

## 2021-04-28 PROCEDURE — 25010000002 ROPIVACAINE PER 1 MG: Performed by: ANESTHESIOLOGY

## 2021-04-28 PROCEDURE — 25010000002 ONDANSETRON PER 1 MG: Performed by: NURSE PRACTITIONER

## 2021-04-28 PROCEDURE — 76942 ECHO GUIDE FOR BIOPSY: CPT | Performed by: ORTHOPAEDIC SURGERY

## 2021-04-28 PROCEDURE — 25010000002 VANCOMYCIN PER 500 MG: Performed by: ORTHOPAEDIC SURGERY

## 2021-04-28 PROCEDURE — 73600 X-RAY EXAM OF ANKLE: CPT

## 2021-04-28 PROCEDURE — 25010000002 PROPOFOL 10 MG/ML EMULSION: Performed by: NURSE ANESTHETIST, CERTIFIED REGISTERED

## 2021-04-28 PROCEDURE — 81025 URINE PREGNANCY TEST: CPT | Performed by: ANESTHESIOLOGY

## 2021-04-28 PROCEDURE — 25010000002 DEXAMETHASONE PER 1 MG: Performed by: NURSE ANESTHETIST, CERTIFIED REGISTERED

## 2021-04-28 PROCEDURE — 87075 CULTR BACTERIA EXCEPT BLOOD: CPT | Performed by: ORTHOPAEDIC SURGERY

## 2021-04-28 PROCEDURE — 87176 TISSUE HOMOGENIZATION CULTR: CPT | Performed by: ORTHOPAEDIC SURGERY

## 2021-04-28 PROCEDURE — G0378 HOSPITAL OBSERVATION PER HR: HCPCS

## 2021-04-28 PROCEDURE — 25010000002 ONDANSETRON PER 1 MG: Performed by: NURSE ANESTHETIST, CERTIFIED REGISTERED

## 2021-04-28 PROCEDURE — 99204 OFFICE O/P NEW MOD 45 MIN: CPT | Performed by: INTERNAL MEDICINE

## 2021-04-28 PROCEDURE — 25010000002 NEOSTIGMINE 5 MG/10ML SOLUTION: Performed by: NURSE ANESTHETIST, CERTIFIED REGISTERED

## 2021-04-28 PROCEDURE — 87205 SMEAR GRAM STAIN: CPT | Performed by: ORTHOPAEDIC SURGERY

## 2021-04-28 PROCEDURE — 76000 FLUOROSCOPY <1 HR PHYS/QHP: CPT

## 2021-04-28 RX ORDER — PROPOFOL 10 MG/ML
VIAL (ML) INTRAVENOUS AS NEEDED
Status: DISCONTINUED | OUTPATIENT
Start: 2021-04-28 | End: 2021-04-28 | Stop reason: SURG

## 2021-04-28 RX ORDER — SODIUM CHLORIDE, SODIUM LACTATE, POTASSIUM CHLORIDE, CALCIUM CHLORIDE 600; 310; 30; 20 MG/100ML; MG/100ML; MG/100ML; MG/100ML
9 INJECTION, SOLUTION INTRAVENOUS CONTINUOUS
Status: DISCONTINUED | OUTPATIENT
Start: 2021-04-28 | End: 2021-04-29 | Stop reason: HOSPADM

## 2021-04-28 RX ORDER — ONDANSETRON 2 MG/ML
4 INJECTION INTRAMUSCULAR; INTRAVENOUS ONCE AS NEEDED
Status: COMPLETED | OUTPATIENT
Start: 2021-04-28 | End: 2021-04-28

## 2021-04-28 RX ORDER — NALBUPHINE HCL 10 MG/ML
10 AMPUL (ML) INJECTION EVERY 4 HOURS PRN
Status: DISCONTINUED | OUTPATIENT
Start: 2021-04-28 | End: 2021-04-28 | Stop reason: HOSPADM

## 2021-04-28 RX ORDER — VANCOMYCIN HYDROCHLORIDE 1 G/200ML
1000 INJECTION, SOLUTION INTRAVENOUS EVERY 12 HOURS
Status: DISCONTINUED | OUTPATIENT
Start: 2021-04-28 | End: 2021-04-29

## 2021-04-28 RX ORDER — NALOXONE HCL 0.4 MG/ML
0.4 VIAL (ML) INJECTION
Status: DISCONTINUED | OUTPATIENT
Start: 2021-04-28 | End: 2021-04-29 | Stop reason: HOSPADM

## 2021-04-28 RX ORDER — DIPHENHYDRAMINE HCL 25 MG
25 CAPSULE ORAL EVERY 6 HOURS PRN
Status: DISCONTINUED | OUTPATIENT
Start: 2021-04-28 | End: 2021-04-29 | Stop reason: HOSPADM

## 2021-04-28 RX ORDER — CLINDAMYCIN PHOSPHATE 600 MG/50ML
600 INJECTION INTRAVENOUS EVERY 8 HOURS
Status: CANCELLED | OUTPATIENT
Start: 2021-04-28 | End: 2021-04-29

## 2021-04-28 RX ORDER — L.ACID,PARA/B.BIFIDUM/S.THERM 8B CELL
1 CAPSULE ORAL DAILY
Status: DISCONTINUED | OUTPATIENT
Start: 2021-04-28 | End: 2021-04-29 | Stop reason: HOSPADM

## 2021-04-28 RX ORDER — SODIUM CHLORIDE 9 MG/ML
INJECTION, SOLUTION INTRAVENOUS AS NEEDED
Status: DISCONTINUED | OUTPATIENT
Start: 2021-04-28 | End: 2021-04-28 | Stop reason: HOSPADM

## 2021-04-28 RX ORDER — NEOSTIGMINE METHYLSULFATE 0.5 MG/ML
INJECTION, SOLUTION INTRAVENOUS AS NEEDED
Status: DISCONTINUED | OUTPATIENT
Start: 2021-04-28 | End: 2021-04-28 | Stop reason: SURG

## 2021-04-28 RX ORDER — OXYCODONE HYDROCHLORIDE AND ACETAMINOPHEN 5; 325 MG/1; MG/1
2 TABLET ORAL EVERY 4 HOURS PRN
Status: DISCONTINUED | OUTPATIENT
Start: 2021-05-05 | End: 2021-04-29 | Stop reason: HOSPADM

## 2021-04-28 RX ORDER — DIPHENHYDRAMINE HYDROCHLORIDE 50 MG/ML
12.5 INJECTION INTRAMUSCULAR; INTRAVENOUS
Status: DISCONTINUED | OUTPATIENT
Start: 2021-04-28 | End: 2021-04-28 | Stop reason: HOSPADM

## 2021-04-28 RX ORDER — VANCOMYCIN HYDROCHLORIDE 1 G/200ML
1000 INJECTION, SOLUTION INTRAVENOUS ONCE
Status: COMPLETED | OUTPATIENT
Start: 2021-04-28 | End: 2021-04-28

## 2021-04-28 RX ORDER — ASPIRIN 325 MG
325 TABLET, DELAYED RELEASE (ENTERIC COATED) ORAL DAILY
Status: DISCONTINUED | OUTPATIENT
Start: 2021-04-29 | End: 2021-04-29 | Stop reason: HOSPADM

## 2021-04-28 RX ORDER — BUPIVACAINE HYDROCHLORIDE 2.5 MG/ML
INJECTION, SOLUTION EPIDURAL; INFILTRATION; INTRACAUDAL
Status: COMPLETED | OUTPATIENT
Start: 2021-04-28 | End: 2021-04-28

## 2021-04-28 RX ORDER — ROPIVACAINE HYDROCHLORIDE 5 MG/ML
INJECTION, SOLUTION EPIDURAL; INFILTRATION; PERINEURAL
Status: COMPLETED | OUTPATIENT
Start: 2021-04-28 | End: 2021-04-28

## 2021-04-28 RX ORDER — HYDROCODONE BITARTRATE AND ACETAMINOPHEN 5; 325 MG/1; MG/1
1 TABLET ORAL ONCE AS NEEDED
Status: DISCONTINUED | OUTPATIENT
Start: 2021-04-28 | End: 2021-04-28 | Stop reason: HOSPADM

## 2021-04-28 RX ORDER — ROCURONIUM BROMIDE 10 MG/ML
INJECTION, SOLUTION INTRAVENOUS AS NEEDED
Status: DISCONTINUED | OUTPATIENT
Start: 2021-04-28 | End: 2021-04-28 | Stop reason: SURG

## 2021-04-28 RX ORDER — ONDANSETRON 4 MG/1
4 TABLET, FILM COATED ORAL EVERY 6 HOURS PRN
Status: DISCONTINUED | OUTPATIENT
Start: 2021-04-28 | End: 2021-04-29 | Stop reason: HOSPADM

## 2021-04-28 RX ORDER — MIDAZOLAM HYDROCHLORIDE 1 MG/ML
2 INJECTION INTRAMUSCULAR; INTRAVENOUS
Status: DISCONTINUED | OUTPATIENT
Start: 2021-04-28 | End: 2021-04-28 | Stop reason: HOSPADM

## 2021-04-28 RX ORDER — GLYCOPYRROLATE 0.2 MG/ML
INJECTION INTRAMUSCULAR; INTRAVENOUS AS NEEDED
Status: DISCONTINUED | OUTPATIENT
Start: 2021-04-28 | End: 2021-04-28 | Stop reason: SURG

## 2021-04-28 RX ORDER — HYDRALAZINE HYDROCHLORIDE 20 MG/ML
5 INJECTION INTRAMUSCULAR; INTRAVENOUS
Status: DISCONTINUED | OUTPATIENT
Start: 2021-04-28 | End: 2021-04-28 | Stop reason: HOSPADM

## 2021-04-28 RX ORDER — HYDROXYZINE HYDROCHLORIDE 25 MG/1
25 TABLET, FILM COATED ORAL 3 TIMES DAILY PRN
Status: DISCONTINUED | OUTPATIENT
Start: 2021-04-28 | End: 2021-04-29 | Stop reason: HOSPADM

## 2021-04-28 RX ORDER — FENTANYL CITRATE 50 UG/ML
50 INJECTION, SOLUTION INTRAMUSCULAR; INTRAVENOUS
Status: DISCONTINUED | OUTPATIENT
Start: 2021-04-28 | End: 2021-04-28 | Stop reason: HOSPADM

## 2021-04-28 RX ORDER — HYDROMORPHONE HYDROCHLORIDE 1 MG/ML
0.25 INJECTION, SOLUTION INTRAMUSCULAR; INTRAVENOUS; SUBCUTANEOUS
Status: DISCONTINUED | OUTPATIENT
Start: 2021-04-28 | End: 2021-04-28 | Stop reason: HOSPADM

## 2021-04-28 RX ORDER — LIDOCAINE HYDROCHLORIDE 10 MG/ML
0.5 INJECTION, SOLUTION EPIDURAL; INFILTRATION; INTRACAUDAL; PERINEURAL ONCE AS NEEDED
Status: DISCONTINUED | OUTPATIENT
Start: 2021-04-28 | End: 2021-04-28 | Stop reason: HOSPADM

## 2021-04-28 RX ORDER — OXYCODONE HYDROCHLORIDE AND ACETAMINOPHEN 5; 325 MG/1; MG/1
1 TABLET ORAL EVERY 4 HOURS PRN
Status: DISCONTINUED | OUTPATIENT
Start: 2021-04-28 | End: 2021-04-29 | Stop reason: HOSPADM

## 2021-04-28 RX ORDER — PROMETHAZINE HYDROCHLORIDE 25 MG/1
25 TABLET ORAL ONCE AS NEEDED
Status: DISCONTINUED | OUTPATIENT
Start: 2021-04-28 | End: 2021-04-28 | Stop reason: HOSPADM

## 2021-04-28 RX ORDER — BISACODYL 10 MG
10 SUPPOSITORY, RECTAL RECTAL DAILY PRN
Status: DISCONTINUED | OUTPATIENT
Start: 2021-04-28 | End: 2021-04-29 | Stop reason: HOSPADM

## 2021-04-28 RX ORDER — LEVOFLOXACIN 750 MG/1
750 TABLET ORAL EVERY 24 HOURS
Status: DISCONTINUED | OUTPATIENT
Start: 2021-04-28 | End: 2021-04-29 | Stop reason: HOSPADM

## 2021-04-28 RX ORDER — SODIUM CHLORIDE 0.9 % (FLUSH) 0.9 %
10 SYRINGE (ML) INJECTION EVERY 12 HOURS SCHEDULED
Status: DISCONTINUED | OUTPATIENT
Start: 2021-04-28 | End: 2021-04-28 | Stop reason: HOSPADM

## 2021-04-28 RX ORDER — DEXAMETHASONE SODIUM PHOSPHATE 10 MG/ML
INJECTION INTRAMUSCULAR; INTRAVENOUS AS NEEDED
Status: DISCONTINUED | OUTPATIENT
Start: 2021-04-28 | End: 2021-04-28 | Stop reason: SURG

## 2021-04-28 RX ORDER — ACETAMINOPHEN 500 MG
500 TABLET ORAL ONCE
Status: COMPLETED | OUTPATIENT
Start: 2021-04-28 | End: 2021-04-28

## 2021-04-28 RX ORDER — HYDROMORPHONE HYDROCHLORIDE 1 MG/ML
0.5 INJECTION, SOLUTION INTRAMUSCULAR; INTRAVENOUS; SUBCUTANEOUS
Status: DISCONTINUED | OUTPATIENT
Start: 2021-04-28 | End: 2021-04-29 | Stop reason: HOSPADM

## 2021-04-28 RX ORDER — NALOXONE HCL 0.4 MG/ML
0.4 VIAL (ML) INJECTION AS NEEDED
Status: DISCONTINUED | OUTPATIENT
Start: 2021-04-28 | End: 2021-04-28 | Stop reason: HOSPADM

## 2021-04-28 RX ORDER — MIDAZOLAM HYDROCHLORIDE 1 MG/ML
1 INJECTION INTRAMUSCULAR; INTRAVENOUS
Status: DISCONTINUED | OUTPATIENT
Start: 2021-04-28 | End: 2021-04-28 | Stop reason: HOSPADM

## 2021-04-28 RX ORDER — VANCOMYCIN HYDROCHLORIDE 1 G/20ML
INJECTION, POWDER, LYOPHILIZED, FOR SOLUTION INTRAVENOUS AS NEEDED
Status: DISCONTINUED | OUTPATIENT
Start: 2021-04-28 | End: 2021-04-28 | Stop reason: HOSPADM

## 2021-04-28 RX ORDER — ACETAMINOPHEN 325 MG/1
325 TABLET ORAL EVERY 4 HOURS PRN
Status: DISCONTINUED | OUTPATIENT
Start: 2021-04-28 | End: 2021-04-29 | Stop reason: HOSPADM

## 2021-04-28 RX ORDER — LIDOCAINE HYDROCHLORIDE 20 MG/ML
INJECTION, SOLUTION INFILTRATION; PERINEURAL AS NEEDED
Status: DISCONTINUED | OUTPATIENT
Start: 2021-04-28 | End: 2021-04-28 | Stop reason: SURG

## 2021-04-28 RX ORDER — ONDANSETRON 2 MG/ML
INJECTION INTRAMUSCULAR; INTRAVENOUS AS NEEDED
Status: DISCONTINUED | OUTPATIENT
Start: 2021-04-28 | End: 2021-04-28 | Stop reason: SURG

## 2021-04-28 RX ORDER — SODIUM CHLORIDE 0.9 % (FLUSH) 0.9 %
10 SYRINGE (ML) INJECTION AS NEEDED
Status: DISCONTINUED | OUTPATIENT
Start: 2021-04-28 | End: 2021-04-28 | Stop reason: HOSPADM

## 2021-04-28 RX ORDER — DROPERIDOL 2.5 MG/ML
0.62 INJECTION, SOLUTION INTRAMUSCULAR; INTRAVENOUS ONCE
Status: COMPLETED | OUTPATIENT
Start: 2021-04-28 | End: 2021-04-28

## 2021-04-28 RX ORDER — NALBUPHINE HCL 10 MG/ML
2 AMPUL (ML) INJECTION EVERY 4 HOURS PRN
Status: DISCONTINUED | OUTPATIENT
Start: 2021-04-28 | End: 2021-04-28 | Stop reason: HOSPADM

## 2021-04-28 RX ORDER — ONDANSETRON 2 MG/ML
4 INJECTION INTRAMUSCULAR; INTRAVENOUS EVERY 6 HOURS PRN
Status: DISCONTINUED | OUTPATIENT
Start: 2021-04-28 | End: 2021-04-29 | Stop reason: HOSPADM

## 2021-04-28 RX ADMIN — Medication 1 CAPSULE: at 16:37

## 2021-04-28 RX ADMIN — VANCOMYCIN HYDROCHLORIDE 1000 MG: 1 INJECTION, SOLUTION INTRAVENOUS at 10:19

## 2021-04-28 RX ADMIN — NEOSTIGMINE METHYLSULFATE 3 MG: 0.5 INJECTION INTRAVENOUS at 10:58

## 2021-04-28 RX ADMIN — ONDANSETRON 4 MG: 2 INJECTION INTRAMUSCULAR; INTRAVENOUS at 20:27

## 2021-04-28 RX ADMIN — BUPIVACAINE HYDROCHLORIDE 20 ML: 2.5 INJECTION, SOLUTION EPIDURAL; INFILTRATION; INTRACAUDAL; PERINEURAL at 09:22

## 2021-04-28 RX ADMIN — LEVOFLOXACIN 750 MG: 750 TABLET, FILM COATED ORAL at 16:37

## 2021-04-28 RX ADMIN — DIPHENHYDRAMINE HYDROCHLORIDE 25 MG: 25 CAPSULE ORAL at 23:00

## 2021-04-28 RX ADMIN — VANCOMYCIN HYDROCHLORIDE 1000 MG: 1 INJECTION, SOLUTION INTRAVENOUS at 17:59

## 2021-04-28 RX ADMIN — ACETAMINOPHEN 500 MG: 500 TABLET, FILM COATED ORAL at 08:52

## 2021-04-28 RX ADMIN — ONDANSETRON 4 MG: 2 INJECTION INTRAMUSCULAR; INTRAVENOUS at 12:16

## 2021-04-28 RX ADMIN — PROPOFOL 180 MG: 10 INJECTION, EMULSION INTRAVENOUS at 09:56

## 2021-04-28 RX ADMIN — DROPERIDOL 0.62 MG: 2.5 INJECTION, SOLUTION INTRAMUSCULAR; INTRAVENOUS at 12:29

## 2021-04-28 RX ADMIN — MIDAZOLAM 2 MG: 1 INJECTION INTRAMUSCULAR; INTRAVENOUS at 09:08

## 2021-04-28 RX ADMIN — OXYCODONE HYDROCHLORIDE AND ACETAMINOPHEN 1 TABLET: 5; 325 TABLET ORAL at 20:26

## 2021-04-28 RX ADMIN — LIDOCAINE HYDROCHLORIDE 60 MG: 20 INJECTION, SOLUTION INFILTRATION; PERINEURAL at 09:56

## 2021-04-28 RX ADMIN — ROCURONIUM BROMIDE 25 MG: 50 INJECTION INTRAVENOUS at 09:56

## 2021-04-28 RX ADMIN — ONDANSETRON 4 MG: 2 INJECTION INTRAMUSCULAR; INTRAVENOUS at 10:51

## 2021-04-28 RX ADMIN — HYDROXYZINE HYDROCHLORIDE 25 MG: 25 TABLET ORAL at 18:13

## 2021-04-28 RX ADMIN — GLYCOPYRROLATE 0.4 MG: 0.2 INJECTION INTRAMUSCULAR; INTRAVENOUS at 10:58

## 2021-04-28 RX ADMIN — FENTANYL CITRATE 50 MCG: 50 INJECTION INTRAMUSCULAR; INTRAVENOUS at 09:08

## 2021-04-28 RX ADMIN — ROPIVACAINE HYDROCHLORIDE 20 ML: 5 INJECTION, SOLUTION EPIDURAL; INFILTRATION; PERINEURAL at 09:22

## 2021-04-28 RX ADMIN — DEXAMETHASONE SODIUM PHOSPHATE 8 MG: 10 INJECTION INTRAMUSCULAR; INTRAVENOUS at 10:08

## 2021-04-28 RX ADMIN — SODIUM CHLORIDE, POTASSIUM CHLORIDE, SODIUM LACTATE AND CALCIUM CHLORIDE 9 ML/HR: 600; 310; 30; 20 INJECTION, SOLUTION INTRAVENOUS at 08:29

## 2021-04-28 NOTE — ANESTHESIA PROCEDURE NOTES
Airway  Urgency: elective    Date/Time: 4/28/2021 9:59 AM  Airway not difficult    General Information and Staff    Patient location during procedure: OR  Anesthesiologist: Joseph Wolfe MD  CRNA: Tila Culp CRNA    Indications and Patient Condition  Indications for airway management: airway protection    Preoxygenated: yes (pt pre-O2 with 100% O2)  Mask difficulty assessment: 2 - vent by mask + OA or adjuvant +/- NMBA (easy BMV )    Final Airway Details  Final airway type: endotracheal airway      Successful airway: ETT  Cuffed: yes   Successful intubation technique: direct laryngoscopy  Endotracheal tube insertion site: oral  Blade: Dianna  Blade size: 3  ETT size (mm): 7.0  Cormack-Lehane Classification: grade I - full view of glottis  Placement verified by: chest auscultation and capnometry   Cuff volume (mL): 7  Measured from: lips  ETT/EBT  to lips (cm): 21  Number of attempts at approach: 1  Assessment: lips, teeth, and gum same as pre-op and atraumatic intubation    Additional Comments  ATOETx1. No change in dentition.

## 2021-04-28 NOTE — ANESTHESIA PREPROCEDURE EVALUATION
Anesthesia Evaluation     NPO Solid Status: > 8 hours             Airway   Mallampati: II  TM distance: >3 FB  Neck ROM: full  No difficulty expected  Dental - normal exam     Pulmonary - negative pulmonary ROS and normal exam   Cardiovascular - negative cardio ROS  Exercise tolerance: excellent (>7 METS)    Rhythm: regular        Neuro/Psych  GI/Hepatic/Renal/Endo - negative ROS     Musculoskeletal (-) negative ROS    Abdominal    Substance History      OB/GYN          Other                        Anesthesia Plan    ASA 1     general with block   (  D/W R&B of GA including but not limited to: heart, lung, liver, kidney, neurologic problems, positioning injuries, dental damage, corneal abrasion and TMJ.  .Risks of peripheral nerve block were discussed with patient including but not limited to: inadequate block, bleeding, infection, persistent numbness or weakness, nerve damage, painful dysasthesia and need to protect limb while numb.)  intravenous induction

## 2021-04-28 NOTE — ANESTHESIA PROCEDURE NOTES
Peripheral Block    Pre-sedation assessment completed: 4/28/2021 9:12 AM    Patient reassessed immediately prior to procedure    Patient location during procedure: pre-op  Start time: 4/28/2021 9:12 AM  Stop time: 4/28/2021 9:17 AM  Reason for block: at surgeon's request and post-op pain management  Performed by  Anesthesiologist: Joseph Wolfe MD  Preanesthetic Checklist  Completed: patient identified, IV checked, site marked, risks and benefits discussed, surgical consent, monitors and equipment checked, pre-op evaluation and timeout performed  Prep:  Sterile barriers:gloves  Prep: ChloraPrep  Patient monitoring: blood pressure monitoring, continuous pulse oximetry and EKG  Procedure  Sedation:yes    Guidance:ultrasound guided  ULTRASOUND INTERPRETATION.  Using ultrasound guidance a 22 G gauge needle was placed in close proximity to the sciatic nerve, at which point, under ultrasound guidance anesthetic was injected in the area of the nerve and spread of the anesthesia was seen on ultrasound in close proximity thereto.  There were no abnormalities seen on ultrasound; a digital image was taken; and the patient tolerated the procedure with no complications. Images:still images obtained    Laterality:right  Block Type:sciatic and popliteal  Injection Technique:single-shot  Needle Type:short-bevel  Needle Gauge:22 G      Medications Used: ropivacaine (NAROPIN) 0.5 % injection, 20 mL      Post Assessment  Injection Assessment: negative aspiration for heme, no paresthesia on injection and incremental injection  Patient Tolerance:comfortable throughout block  Complications:no

## 2021-04-28 NOTE — ANESTHESIA PROCEDURE NOTES
Peripheral Block    Pre-sedation assessment completed: 4/28/2021 9:17 AM    Patient reassessed immediately prior to procedure    Patient location during procedure: pre-op  Start time: 4/28/2021 9:17 AM  Stop time: 4/28/2021 9:20 AM  Reason for block: at surgeon's request and post-op pain management  Performed by  Anesthesiologist: Joseph Wolfe MD  Preanesthetic Checklist  Completed: patient identified, IV checked, site marked, risks and benefits discussed, surgical consent, monitors and equipment checked, pre-op evaluation and timeout performed  Prep:  Sterile barriers:gloves  Prep: ChloraPrep  Patient monitoring: blood pressure monitoring, continuous pulse oximetry and EKG  Procedure  Sedation:yes    Guidance:ultrasound guided  ULTRASOUND INTERPRETATION.  Using ultrasound guidance a 22 G gauge needle was placed in close proximity to the femoral nerve, at which point, under ultrasound guidance anesthetic was injected in the area of the nerve and spread of the anesthesia was seen on ultrasound in close proximity thereto.  There were no abnormalities seen on ultrasound; a digital image was taken; and the patient tolerated the procedure with no complications. Images:still images obtained    Laterality:right  Block Type:adductor canal block  Injection Technique:single-shot  Needle Type:short-bevel  Needle Gauge:22 G      Medications Used: bupivacaine PF (MARCAINE) 0.25 % injection, 20 mL      Medications  Comment:.    Post Assessment  Injection Assessment: negative aspiration for heme, no paresthesia on injection and incremental injection  Patient Tolerance:comfortable throughout block  Complications:no

## 2021-04-28 NOTE — ANESTHESIA POSTPROCEDURE EVALUATION
Patient: Nicole Birch    Procedure Summary     Date: 04/28/21 Room / Location:  [facility]  [facility]  [facility]    Anesthesia Start: 0948 Anesthesia Stop: 1114    Procedure: right ankle/Achilles tendon irrigation, debridement/saucerization of calcaneus (Right ) Diagnosis:       Dog bite of ankle, left, initial encounter      (Dog bite of ankle, left, initial encounter [S91.052A, W54.0XXA])    Surgeons: Thuan Perez Jr., MD Provider: Joseph Wolfe MD    Anesthesia Type: general with block ASA Status: 1          Anesthesia Type: general with block    Vitals  Vitals Value Taken Time   /82 04/28/21 1215   Temp 36.4 °C (97.6 °F) 04/28/21 1115   Pulse 64 04/28/21 1215   Resp 16 04/28/21 1215   SpO2 97 % 04/28/21 1219   Vitals shown include unvalidated device data.        Post Anesthesia Care and Evaluation    Patient location during evaluation: bedside  Patient participation: complete - patient participated  Level of consciousness: awake and alert  Pain management: adequate  Airway patency: patent  Anesthetic complications: No anesthetic complications    Cardiovascular status: acceptable  Respiratory status: acceptable  Hydration status: acceptable    Comments: /82   Pulse 64   Temp 36.4 °C (97.6 °F)   Resp 16   Wt 57.9 kg (127 lb 10.3 oz)   SpO2 97%   BMI 19.41 kg/m²

## 2021-04-29 VITALS
RESPIRATION RATE: 18 BRPM | TEMPERATURE: 97 F | HEIGHT: 68 IN | HEART RATE: 73 BPM | DIASTOLIC BLOOD PRESSURE: 61 MMHG | BODY MASS INDEX: 19.35 KG/M2 | WEIGHT: 127.65 LBS | OXYGEN SATURATION: 98 % | SYSTOLIC BLOOD PRESSURE: 96 MMHG

## 2021-04-29 LAB
CRP SERPL-MCNC: <0.3 MG/DL (ref 0–0.5)
ERYTHROCYTE [SEDIMENTATION RATE] IN BLOOD: 7 MM/HR (ref 0–20)
VANCOMYCIN TROUGH SERPL-MCNC: 8.4 MCG/ML (ref 5–20)

## 2021-04-29 PROCEDURE — 80202 ASSAY OF VANCOMYCIN: CPT | Performed by: ORTHOPAEDIC SURGERY

## 2021-04-29 PROCEDURE — 86140 C-REACTIVE PROTEIN: CPT | Performed by: INTERNAL MEDICINE

## 2021-04-29 PROCEDURE — 85652 RBC SED RATE AUTOMATED: CPT | Performed by: INTERNAL MEDICINE

## 2021-04-29 PROCEDURE — 97161 PT EVAL LOW COMPLEX 20 MIN: CPT

## 2021-04-29 PROCEDURE — 25010000002 VANCOMYCIN PER 500 MG: Performed by: ORTHOPAEDIC SURGERY

## 2021-04-29 PROCEDURE — C1751 CATH, INF, PER/CENT/MIDLINE: HCPCS

## 2021-04-29 PROCEDURE — G0378 HOSPITAL OBSERVATION PER HR: HCPCS

## 2021-04-29 RX ORDER — OXYCODONE HYDROCHLORIDE AND ACETAMINOPHEN 5; 325 MG/1; MG/1
TABLET ORAL
Qty: 30 TABLET | Refills: 0 | Status: SHIPPED | OUTPATIENT
Start: 2021-04-29 | End: 2021-06-15

## 2021-04-29 RX ORDER — BISACODYL 10 MG
10 SUPPOSITORY, RECTAL RECTAL DAILY PRN
Status: DISCONTINUED | OUTPATIENT
Start: 2021-04-29 | End: 2021-04-29 | Stop reason: HOSPADM

## 2021-04-29 RX ORDER — ASPIRIN 325 MG
325 TABLET, DELAYED RELEASE (ENTERIC COATED) ORAL DAILY
Qty: 30 TABLET | Refills: 0 | Status: SHIPPED | OUTPATIENT
Start: 2021-04-29 | End: 2021-06-15

## 2021-04-29 RX ORDER — SODIUM CHLORIDE 0.9 % (FLUSH) 0.9 %
10 SYRINGE (ML) INJECTION EVERY 12 HOURS SCHEDULED
Status: DISCONTINUED | OUTPATIENT
Start: 2021-04-29 | End: 2021-04-29 | Stop reason: HOSPADM

## 2021-04-29 RX ORDER — SODIUM CHLORIDE 0.9 % (FLUSH) 0.9 %
10 SYRINGE (ML) INJECTION AS NEEDED
Status: DISCONTINUED | OUTPATIENT
Start: 2021-04-29 | End: 2021-04-29 | Stop reason: HOSPADM

## 2021-04-29 RX ORDER — SODIUM CHLORIDE 0.9 % (FLUSH) 0.9 %
20 SYRINGE (ML) INJECTION AS NEEDED
Status: DISCONTINUED | OUTPATIENT
Start: 2021-04-29 | End: 2021-04-29 | Stop reason: HOSPADM

## 2021-04-29 RX ORDER — PSEUDOEPHEDRINE HCL 30 MG
100 TABLET ORAL 2 TIMES DAILY
Qty: 60 CAPSULE | Refills: 0 | Status: SHIPPED | OUTPATIENT
Start: 2021-04-29 | End: 2022-06-03

## 2021-04-29 RX ORDER — DIAZEPAM 5 MG/1
5 TABLET ORAL EVERY 8 HOURS PRN
Qty: 30 TABLET | Refills: 0 | Status: SHIPPED | OUTPATIENT
Start: 2021-04-29 | End: 2021-05-09

## 2021-04-29 RX ORDER — LEVOFLOXACIN 750 MG/1
750 TABLET ORAL DAILY
Qty: 42 TABLET | Refills: 0 | Status: SHIPPED | OUTPATIENT
Start: 2021-04-29 | End: 2021-04-30

## 2021-04-29 RX ORDER — VANCOMYCIN HYDROCHLORIDE 1 G/200ML
1000 INJECTION, SOLUTION INTRAVENOUS EVERY 8 HOURS
Status: DISCONTINUED | OUTPATIENT
Start: 2021-04-29 | End: 2021-04-29 | Stop reason: HOSPADM

## 2021-04-29 RX ORDER — SCOLOPAMINE TRANSDERMAL SYSTEM 1 MG/1
1 PATCH, EXTENDED RELEASE TRANSDERMAL
Status: DISCONTINUED | OUTPATIENT
Start: 2021-04-29 | End: 2021-04-29 | Stop reason: HOSPADM

## 2021-04-29 RX ORDER — DIAZEPAM 5 MG/1
5 TABLET ORAL EVERY 8 HOURS PRN
Status: DISCONTINUED | OUTPATIENT
Start: 2021-04-29 | End: 2021-04-29 | Stop reason: HOSPADM

## 2021-04-29 RX ORDER — ONDANSETRON 4 MG/1
4 TABLET, FILM COATED ORAL EVERY 6 HOURS PRN
Qty: 20 TABLET | Refills: 0 | Status: SHIPPED | OUTPATIENT
Start: 2021-04-29 | End: 2022-06-03

## 2021-04-29 RX ORDER — DOCUSATE SODIUM 100 MG/1
100 CAPSULE, LIQUID FILLED ORAL 2 TIMES DAILY
Status: DISCONTINUED | OUTPATIENT
Start: 2021-04-29 | End: 2021-04-29 | Stop reason: HOSPADM

## 2021-04-29 RX ORDER — POLYETHYLENE GLYCOL 3350 17 G/17G
17 POWDER, FOR SOLUTION ORAL DAILY
Status: DISCONTINUED | OUTPATIENT
Start: 2021-04-29 | End: 2021-04-29 | Stop reason: HOSPADM

## 2021-04-29 RX ADMIN — VANCOMYCIN HYDROCHLORIDE 1000 MG: 1 INJECTION, SOLUTION INTRAVENOUS at 06:05

## 2021-04-29 RX ADMIN — VANCOMYCIN HYDROCHLORIDE 1000 MG: 1 INJECTION, SOLUTION INTRAVENOUS at 18:53

## 2021-04-29 RX ADMIN — Medication 1 CAPSULE: at 09:51

## 2021-04-29 RX ADMIN — ASPIRIN 325 MG: 325 TABLET, COATED ORAL at 09:50

## 2021-04-29 RX ADMIN — OXYCODONE HYDROCHLORIDE AND ACETAMINOPHEN 1 TABLET: 5; 325 TABLET ORAL at 15:59

## 2021-04-29 RX ADMIN — LEVOFLOXACIN 750 MG: 750 TABLET, FILM COATED ORAL at 14:13

## 2021-04-29 RX ADMIN — DOCUSATE SODIUM 100 MG: 100 CAPSULE, LIQUID FILLED ORAL at 09:50

## 2021-04-29 RX ADMIN — OXYCODONE HYDROCHLORIDE AND ACETAMINOPHEN 1 TABLET: 5; 325 TABLET ORAL at 06:05

## 2021-04-29 RX ADMIN — POLYETHYLENE GLYCOL 3350 17 G: 17 POWDER, FOR SOLUTION ORAL at 09:50

## 2021-04-29 RX ADMIN — SCOLOPAMINE TRANSDERMAL SYSTEM 1 PATCH: 1 PATCH, EXTENDED RELEASE TRANSDERMAL at 09:50

## 2021-04-29 RX ADMIN — OXYCODONE HYDROCHLORIDE AND ACETAMINOPHEN 1 TABLET: 5; 325 TABLET ORAL at 10:48

## 2021-05-01 LAB
BACTERIA SPEC AEROBE CULT: NORMAL
BACTERIA SPEC AEROBE CULT: NORMAL
GRAM STN SPEC: NORMAL
GRAM STN SPEC: NORMAL

## 2021-05-01 NOTE — ASSESSMENT & PLAN NOTE
Take levofloxacin later in day within 1.5 to 2 hours after eating.    Call Dr Ban Hammond office on Monday 5/3 for update on wound c&S that had not shown growth at 72 hour incubation.

## 2021-05-03 ENCOUNTER — NURSE TRIAGE (OUTPATIENT)
Dept: CALL CENTER | Facility: HOSPITAL | Age: 41
End: 2021-05-03

## 2021-05-03 ENCOUNTER — TELEPHONE (OUTPATIENT)
Dept: INFECTIOUS DISEASES | Facility: CLINIC | Age: 41
End: 2021-05-03

## 2021-05-03 LAB — BACTERIA SPEC ANAEROBE CULT: NORMAL

## 2021-05-03 NOTE — OUTREACH NOTE
Case Management Call Center Follow-up      Responses   [facility] Call Center Tracking Reason?  No Home Health   Has the Call Center Case Management Follow-up issue been resolved?  Yes   Follow-up Comments  Talked to the patient and she is wanting [facility] to come to her home instead of her having to go to the infusion center since she cannot drive at this time.  I called L[facility] and she did get permission to do home visits for this patient and will start with that asap.  Patient is happy.          Shannon Epley, RN    5/3/2021, 15:24 CDT

## 2021-05-03 NOTE — TELEPHONE ENCOUNTER
"Caller has a PICC line and is getting IV antibiotics.  She was asked about HH for infusions and she has not heard from anyone yet.  She did go to the infusion center today but she is unable to drive and it will be much easier for her if she had HH.  Call routed to case management.    Reason for Disposition  • Health Information question, no triage required and triager able to answer question    Additional Information  • Negative: [1] Caller is not with the adult (patient) AND [2] reporting urgent symptoms  • Negative: Lab result questions  • Negative: Medication questions  • Negative: Caller can't be reached by phone  • Negative: Caller has already spoken to PCP or another triager  • Negative: RN needs further essential information from caller in order to complete triage  • Negative: Requesting regular office appointment  • Negative: [1] Caller requesting NON-URGENT health information AND [2] PCP's office is the best resource    Answer Assessment - Initial Assessment Questions  1. REASON FOR CALL or QUESTION: \"What is your reason for calling today?\" or \"How can I best help you?\" or \"What question do you have that I can help answer?\"      I was asked if I wanted HH for my infusions and I said yes but have not heard from anyone yet.    Protocols used: INFORMATION ONLY CALL - NO TRIAGE-ADULT-AH      "

## 2021-05-03 NOTE — TELEPHONE ENCOUNTER
"Daly [place] states that the patient is at their clinic for infusion and is complaining of \"hissing\" sound in her ears.  After further conversation, the patient states that she had the hissing sound prior to surgery and she notices it more in the evenings when she's setting down to watch TV.  Patient states that she did notice that the sound was somewhat better last night.  I spoke with Dr. Hammond regarding this issue and since the patient has been experiencing prior to surgery, Dr. Hammond suggest that the patient contact her PCP to them know.  Dr. Hammond also wanted to inform the patient that if the pts cultures come back with growth that we will let her know.  The did patient state that she is still experiencing some nausea with LVQ.  "

## 2021-05-05 NOTE — TELEPHONE ENCOUNTER
"Pt phoned the office yesterday afternoon stating that no one had returned her call.  I informed the patient that I had spoke with her the day before when she was having infusion at [facility].  Patient states that the ringing in her ears is terrible and that she does have an appointment to see her PCP.  Patient also states that she is having \"side effects\" of some constipation and nausea from LVQ.  I asked the patient if she has zofran that she can try taking with the medication.  Patient states yes that she does have zofran and will try taking it.  Patient also wanted to know about her cultures.  I informed the patient, that as I had told her the day before, that they are still negative to date, but are also still pending and once they have finalized or if any changes occur that we will make her aware.  Patient voiced understanding.  "

## 2021-05-06 ENCOUNTER — TELEPHONE (OUTPATIENT)
Dept: INFECTIOUS DISEASES | Facility: CLINIC | Age: 41
End: 2021-05-06

## 2021-05-06 NOTE — TELEPHONE ENCOUNTER
"Phone with patient's  He asked me to send a message to Dr. Hammond that they feel like \"she owes them a phone call\". I asked him if there was anything I could help them with or any new problems and he stated no, they just want her doctor to personally call them and give them an update on her condition. HARVEY, RN  "

## 2021-05-07 RX ORDER — AMOXICILLIN AND CLAVULANATE POTASSIUM 875; 125 MG/1; MG/1
1 TABLET, FILM COATED ORAL EVERY 12 HOURS
Qty: 84 TABLET | Refills: 0 | Status: SHIPPED | OUTPATIENT
Start: 2021-05-07 | End: 2021-06-15

## 2021-05-08 LAB — BACTERIA SPEC ANAEROBE CULT: NORMAL

## 2021-06-09 ENCOUNTER — OFFICE VISIT (OUTPATIENT)
Dept: INFECTIOUS DISEASES | Facility: CLINIC | Age: 41
End: 2021-06-09

## 2021-06-09 VITALS
BODY MASS INDEX: 18.49 KG/M2 | HEIGHT: 68 IN | SYSTOLIC BLOOD PRESSURE: 97 MMHG | TEMPERATURE: 97.1 F | WEIGHT: 122 LBS | HEART RATE: 61 BPM | DIASTOLIC BLOOD PRESSURE: 63 MMHG

## 2021-06-09 DIAGNOSIS — M86.271 SUBACUTE OSTEOMYELITIS OF RIGHT ANKLE (HCC): Primary | ICD-10-CM

## 2021-06-09 DIAGNOSIS — Z79.2 LONG TERM (CURRENT) USE OF ANTIBIOTICS: ICD-10-CM

## 2021-06-09 PROCEDURE — 99214 OFFICE O/P EST MOD 30 MIN: CPT | Performed by: INTERNAL MEDICINE

## 2021-06-09 RX ORDER — CLONAZEPAM 0.5 MG/1
TABLET ORAL
COMMUNITY
Start: 2021-05-26

## 2021-06-09 RX ORDER — FLUOXETINE HYDROCHLORIDE 40 MG/1
60 CAPSULE ORAL
COMMUNITY
Start: 2021-06-07 | End: 2022-05-12

## 2021-06-09 NOTE — PROGRESS NOTES
Reason for clinic visits: Follow up for right ankle infection    HPI: Nicole Birch is a 41 y.o. female who was last seen in the hospital on April 28.  At that time she was discharged on IV vancomycin and Levaquin.  Unfortunately she did not tolerate the Levaquin well and was switched to Augmentin 875 mg p.o. twice daily around May 7.  She is currently doing very well.  Her right ankle is completely healed.  She denies any pain.  She initially reported nausea denies any vomiting abdominal pain or diarrhea.  She has a rash on her right lower extremity which she attributes to the boot.  No rash anywhere else.  She denies any fevers chills or night sweats.  She denies any problems with her PICC line specifically no pain or erythema    Past Medical History:   Diagnosis Date   • Dog bite of lower leg     right ankle    • Hypothyroidism        Past Surgical History:   Procedure Laterality Date   • INCISION AND DRAINAGE LEG Right 4/28/2021    Procedure: right ankle/Achilles tendon irrigation, debridement/saucerization of calcaneus;  Surgeon: Thuan Perez Jr., MD;  Location: [facility] OR [facility] [place];  Service: Orthopedics;  Laterality: Right;   • WISDOM TOOTH EXTRACTION         Social History   reports that she has never smoked. She has never used smokeless tobacco. She reports current alcohol use. She reports that she does not use drugs.    Family History  family history includes Cancer (age of onset: 67) in her mother; Heart disease in her father.    Allergies   Allergen Reactions   • Cephalexin Other (See Comments)     Cerner Allergy Text Annotation: Keflex, Cerner Reaction: yeast infection       The medication list has been reviewed and updated.     Review of Systems  Pertinent items are noted in HPI, all other systems reviewed and negative    Vital Signs   Temp:  [97.1 °F (36.2 °C)] 97.1 °F (36.2 °C)  Heart Rate:  [61] 61  BP: (97)/(63) 97/63    Physical Exam:   General: In no acute distress  Cardiovascular: Normal rate,  regular rhythm,    Respiratory: Breathing comfortably on room air   GI: Abdomen is soft, non-tender, non-distended  Skin: Erythematous rash on right lower extremity below knee, right upper extremity PICC line without erythema or purulence, right ankle incision well-healed no erythema or drainage.  Mild swelling persists    Lab Results   Component Value Date    WBC 4.11 (L) 06/07/2021    HGB 13.7 06/07/2021    HCT 40.3 06/07/2021    MCV 95.7 06/07/2021     06/07/2021       Lab Results   Component Value Date    GLUCOSE 84 04/27/2021    BUN 9 04/27/2021    CREATININE 0.69 04/27/2021    EGFRIFNONA 93 04/27/2021    EGFRIFAFRI 110 07/26/2016    BCR 13.0 04/27/2021    CO2 26.9 04/27/2021    CALCIUM 9.2 04/27/2021    PROTENTOTREF 7.0 07/26/2016    ALBUMIN 4.30 04/27/2021    LABIL2 2.2 07/26/2016    AST 22 04/27/2021    ALT 28 04/27/2021       Lab Results   Component Value Date    SEDRATE 7 04/29/2021       Lab Results   Component Value Date    CRP <0.30 04/29/2021       Assessment:  This is a 41 y.o. female who presents to clinic today for follow up right ankle infection.  The patient developed a right ankle infection, right Achilles tenosynovitis and early osteomyelitis of the calcaneus after a dog bite.  She underwent irrigation and debridement, tenosynovectomy, tenolysis with Achillis tendon reconstruction on April 28.  Operative cultures were negative.  She was empirically treated on a 6-week course of IV vancomycin and Augmentin.  We have reviewed her blood work in detail.  She did not report any issues.  Her inflammatory markers have always been within the normal range.  Currently on physical exam there is no signs or symptoms consistent with right ankle infection in her wounds are completely healed.  At this time I recommend stopping all antibiotics.    Plan:   1.  Discontinue IV vancomycin and oral Augmentin  2.  PICC line was pulled in clinic    Return to Infectious Disease clinic as needed

## 2021-06-15 ENCOUNTER — OFFICE VISIT (OUTPATIENT)
Dept: INTERNAL MEDICINE | Facility: CLINIC | Age: 41
End: 2021-06-15

## 2021-06-15 VITALS
WEIGHT: 118 LBS | SYSTOLIC BLOOD PRESSURE: 97 MMHG | BODY MASS INDEX: 17.88 KG/M2 | HEART RATE: 58 BPM | DIASTOLIC BLOOD PRESSURE: 60 MMHG | HEIGHT: 68 IN

## 2021-06-15 DIAGNOSIS — F41.9 ANXIETY: ICD-10-CM

## 2021-06-15 DIAGNOSIS — E55.9 VITAMIN D DEFICIENCY DISEASE: ICD-10-CM

## 2021-06-15 DIAGNOSIS — E03.9 HYPOTHYROIDISM, UNSPECIFIED TYPE: ICD-10-CM

## 2021-06-15 DIAGNOSIS — Z00.00 HEALTHCARE MAINTENANCE: Primary | ICD-10-CM

## 2021-06-15 LAB
25(OH)D3+25(OH)D2 SERPL-MCNC: 26.3 NG/ML (ref 30–100)
ALBUMIN SERPL-MCNC: 5.1 G/DL (ref 3.5–5.2)
ALBUMIN/GLOB SERPL: 3.6 G/DL
ALP SERPL-CCNC: 76 U/L (ref 39–117)
ALT SERPL-CCNC: 17 U/L (ref 1–33)
AST SERPL-CCNC: 11 U/L (ref 1–32)
BASOPHILS # BLD AUTO: 0.05 10*3/MM3 (ref 0–0.2)
BASOPHILS NFR BLD AUTO: 1.1 % (ref 0–1.5)
BILIRUB SERPL-MCNC: 0.9 MG/DL (ref 0–1.2)
BUN SERPL-MCNC: 11 MG/DL (ref 6–20)
BUN/CREAT SERPL: 14.3 (ref 7–25)
CALCIUM SERPL-MCNC: 9.7 MG/DL (ref 8.6–10.5)
CHLORIDE SERPL-SCNC: 102 MMOL/L (ref 98–107)
CHOLEST SERPL-MCNC: 199 MG/DL (ref 0–200)
CO2 SERPL-SCNC: 27.6 MMOL/L (ref 22–29)
CREAT SERPL-MCNC: 0.77 MG/DL (ref 0.57–1)
EOSINOPHIL # BLD AUTO: 0.13 10*3/MM3 (ref 0–0.4)
EOSINOPHIL NFR BLD AUTO: 3 % (ref 0.3–6.2)
ERYTHROCYTE [DISTWIDTH] IN BLOOD BY AUTOMATED COUNT: 12.3 % (ref 12.3–15.4)
FOLATE SERPL-MCNC: 9.53 NG/ML (ref 4.78–24.2)
GLOBULIN SER CALC-MCNC: 1.4 GM/DL
GLUCOSE SERPL-MCNC: 81 MG/DL (ref 65–99)
HCT VFR BLD AUTO: 46.8 % (ref 34–46.6)
HDLC SERPL-MCNC: 68 MG/DL (ref 40–60)
HGB BLD-MCNC: 15.6 G/DL (ref 12–15.9)
IMM GRANULOCYTES # BLD AUTO: 0.01 10*3/MM3 (ref 0–0.05)
IMM GRANULOCYTES NFR BLD AUTO: 0.2 % (ref 0–0.5)
LDLC SERPL CALC-MCNC: 118 MG/DL (ref 0–100)
LDLC/HDLC SERPL: 1.72 {RATIO}
LYMPHOCYTES # BLD AUTO: 1.2 10*3/MM3 (ref 0.7–3.1)
LYMPHOCYTES NFR BLD AUTO: 27.5 % (ref 19.6–45.3)
MCH RBC QN AUTO: 32.6 PG (ref 26.6–33)
MCHC RBC AUTO-ENTMCNC: 33.3 G/DL (ref 31.5–35.7)
MCV RBC AUTO: 97.7 FL (ref 79–97)
MONOCYTES # BLD AUTO: 0.44 10*3/MM3 (ref 0.1–0.9)
MONOCYTES NFR BLD AUTO: 10.1 % (ref 5–12)
NEUTROPHILS # BLD AUTO: 2.53 10*3/MM3 (ref 1.7–7)
NEUTROPHILS NFR BLD AUTO: 58.1 % (ref 42.7–76)
NRBC BLD AUTO-RTO: 0 /100 WBC (ref 0–0.2)
PLATELET # BLD AUTO: 190 10*3/MM3 (ref 140–450)
POTASSIUM SERPL-SCNC: 4.2 MMOL/L (ref 3.5–5.2)
PROT SERPL-MCNC: 6.5 G/DL (ref 6–8.5)
RBC # BLD AUTO: 4.79 10*6/MM3 (ref 3.77–5.28)
SODIUM SERPL-SCNC: 140 MMOL/L (ref 136–145)
TRIGL SERPL-MCNC: 69 MG/DL (ref 0–150)
TSH SERPL DL<=0.005 MIU/L-ACNC: 2.26 UIU/ML (ref 0.27–4.2)
VIT B12 SERPL-MCNC: 343 PG/ML (ref 211–946)
VLDLC SERPL CALC-MCNC: 13 MG/DL (ref 5–40)
WBC # BLD AUTO: 4.36 10*3/MM3 (ref 3.4–10.8)

## 2021-06-15 PROCEDURE — 93000 ELECTROCARDIOGRAM COMPLETE: CPT | Performed by: INTERNAL MEDICINE

## 2021-06-15 PROCEDURE — 99386 PREV VISIT NEW AGE 40-64: CPT | Performed by: INTERNAL MEDICINE

## 2021-06-15 NOTE — PROGRESS NOTES
"Subjective   CPE  Anxiety  S/p leg wound (dog attack)  Tinnitus    Nicole Birch is a 41 y.o. female who presents as a new patient for a complete physical exam.  Patient reports feeling well mentally and physically well prior to March. Mid march she was attacked by a pitbull. Legs were injured and she required surgical wound correction right leg. She also required iv antibiotics and prolonged oral antibiotics. She completed augmentin one week ago. She has been on a probiotic daily throughout. She now has lost appetite with 12#. She has developed anxiety with intrusive thoughts and some compulsions. She is following with PT, psychiatry, and psychology.     Employed by [employer] in [employer].     History of hypothyroidism in pregnancy. Not currently on med for this. History of vitamin d deficiency. Not currently on a supplement for this.     Review of Systems   Constitutional: Negative.    HENT: Positive for rhinorrhea and tinnitus.         Prn claritin    Eyes: Negative.         Annual eye exam.   \"very low\" readers. Glasses at night when necessary.    Respiratory: Negative.    Cardiovascular: Positive for palpitations.        W/ anxiety   Gastrointestinal: Negative for diarrhea.        Looser bm. No true diarrhea. Loss of appetite w/ weight loss   Endocrine: Negative.    Genitourinary: Negative.         Normal menses   Musculoskeletal: Negative.         Denies leg pain   Skin: Negative.         Annual derm   Allergic/Immunologic: Negative.    Neurological: Negative.    Hematological: Negative.    Psychiatric/Behavioral: Positive for sleep disturbance. The patient is nervous/anxious.        The following portions of the patient's history were reviewed and updated as appropriate: allergies, current medications, past family history, past medical history, past social history, past surgical history and problem list.     Patient Active Problem List   Diagnosis   • Axillary mass   • Hypothyroidism   • Vitamin D deficiency disease "   • Dog bite of ankle, left, initial encounter   • Dog bite       Past Medical History:   Diagnosis Date   • Dog bite of lower leg     right ankle    • Hypothyroidism        Past Surgical History:   Procedure Laterality Date   • INCISION AND DRAINAGE LEG Right 4/28/2021    Procedure: right ankle/Achilles tendon irrigation, debridement/saucerization of calcaneus;  Surgeon: Thuan Perez Jr., MD;  [place] [facility] [place] [facility] [place]  Service: Orthopedics;  Laterality: Right;   • WISDOM TOOTH EXTRACTION         Family History   Problem Relation Age of Onset   • Cancer Mother 67        lung    • Heart disease Father    • Malig Hyperthermia Neg Hx        Social History     Socioeconomic History   • Marital status:      Spouse name: Not on file   • Number of children: Not on file   • Years of education: Not on file   • Highest education level: Not on file   Tobacco Use   • Smoking status: Never Smoker   • Smokeless tobacco: Never Used   Vaping Use   • Vaping Use: Never used   Substance and Sexual Activity   • Alcohol use: Yes     Comment: occasionally, prior to pregnancy   • Drug use: No   • Sexual activity: Defer     Partners: Male       Current Outpatient Medications on File Prior to Visit   Medication Sig Dispense Refill   • clonazePAM (KlonoPIN) 0.5 MG tablet      • docusate sodium 100 MG capsule Take 1 capsule by mouth 2 (Two) Times a Day. 60 capsule 0   • FLUoxetine (PROzac) 40 MG capsule      • loratadine (CLARITIN) 10 MG tablet Take 10 mg by mouth At Night As Needed.     • ondansetron (ZOFRAN) 4 MG tablet Take 1 tablet by mouth Every 6 (Six) Hours As Needed for Nausea. 20 tablet 0   • Probiotic Product (PROBIOTIC DAILY PO) Take 1 capsule by mouth Daily.     • [DISCONTINUED] amoxicillin-clavulanate (AUGMENTIN) 875-125 MG per tablet Take 1 tablet by mouth Every 12 (Twelve) Hours for 42 days. 84 tablet 0   • [DISCONTINUED] aspirin  MG tablet Take 1 tablet by mouth Daily. 30 tablet 0   • [DISCONTINUED] hydrOXYzine  "(ATARAX) 25 MG tablet Take 1 tablet by mouth 3 (Three) Times a Day As Needed for Anxiety (1-2 tablets). 30 tablet 0   • [DISCONTINUED] oxyCODONE-acetaminophen (PERCOCET) 5-325 MG per tablet Take 1-2 tablets by mouth every 4 hours as needed for pain 30 tablet 0     No current facility-administered medications on file prior to visit.       Allergies   Allergen Reactions   • Cephalexin Other (See Comments)     Cerner Allergy Text Annotation: Keflex, Cerner Reaction: yeast infection       Immunization History   Administered Date(s) Administered   • COVID-19 (MODERNA) 03/17/2021, 04/14/2021   • Influenza Quad Vaccine (Inpatient) 09/15/2017   • Influenza, Unspecified 09/14/2020   • Tdap 07/07/2017       Objective     BP 97/60   Pulse 58   Ht 172.7 cm (68\")   Wt 53.5 kg (118 lb)   Breastfeeding No   BMI 17.94 kg/m²     Physical Exam  Vitals and nursing note reviewed.   Constitutional:       Appearance: Normal appearance.   HENT:      Head: Normocephalic and atraumatic.      Right Ear: Tympanic membrane normal.      Left Ear: Tympanic membrane normal.      Nose: Nose normal.      Mouth/Throat:      Mouth: Mucous membranes are moist.   Eyes:      Extraocular Movements: Extraocular movements intact.      Pupils: Pupils are equal, round, and reactive to light.   Cardiovascular:      Rate and Rhythm: Normal rate and regular rhythm.      Pulses: Normal pulses.      Heart sounds: Normal heart sounds.   Pulmonary:      Effort: Pulmonary effort is normal.      Breath sounds: Normal breath sounds.   Abdominal:      General: Abdomen is flat.      Palpations: Abdomen is soft.   Musculoskeletal:         General: Normal range of motion.      Cervical back: Normal range of motion.   Skin:     General: Skin is warm and dry.   Neurological:      General: No focal deficit present.      Mental Status: She is alert and oriented to person, place, and time.   Psychiatric:         Mood and Affect: Mood normal.         Behavior: Behavior " normal.         Thought Content: Thought content normal.         Judgment: Judgment normal.         Assessment/Plan   Diagnoses and all orders for this visit:    1. Healthcare maintenance (Primary)  -     CBC & Differential  -     Comprehensive Metabolic Panel  -     Lipid Panel With LDL / HDL Ratio  -     TSH  -     Vitamin B12  -     Vitamin D 25 Hydroxy  -     Folate  -     Urinalysis With Culture If Indicated -  -     ECG 12 Lead    2. Hypothyroidism, unspecified type  -     CBC & Differential  -     Comprehensive Metabolic Panel  -     Lipid Panel With LDL / HDL Ratio  -     TSH  -     Vitamin B12  -     Vitamin D 25 Hydroxy  -     Folate  -     Urinalysis With Culture If Indicated -  -     ECG 12 Lead    3. Vitamin D deficiency disease  -     CBC & Differential  -     Comprehensive Metabolic Panel  -     Lipid Panel With LDL / HDL Ratio  -     TSH  -     Vitamin B12  -     Vitamin D 25 Hydroxy  -     Folate  -     Urinalysis With Culture If Indicated -    4. Anxiety        Discussion    Patient presents today for a CPE.  Patient follows a healthy diet.   Patient follows an adequate exercise regimen. Mammogram is up to date.   Pap smears are performed by the patient's gynecologist.   Immunizations are up to date.  She is advised Hep A in the future.   Will test listed labs. EKG for palpitations and family history of CAD.   Patient counseled at length regarding post traumatic anxiety. She is to engage in new positive activities. Encouraged gratitude and affirmation journaling. To maintain current medications w/ psychiatry and continue to work on cognitive behavioral therapy w/ psychology. Will test listed labs as above. She has weight loss w/ decreased appetite. Will start w/ a bland diet and slowly advance. Will continue probiotic given prolonged antibiotic usage. She will f/u in one month or prn.              Future Appointments   Date Time Provider Department Center   7/16/2021  8:15 AM Shayla Eli MD [facility]  [facility]

## 2021-06-16 LAB
APPEARANCE UR: CLEAR
BACTERIA #/AREA URNS HPF: NORMAL /HPF
BILIRUB UR QL STRIP: NEGATIVE
CASTS URNS QL MICRO: NORMAL /LPF
COLOR UR: YELLOW
EPI CELLS #/AREA URNS HPF: NORMAL /HPF (ref 0–10)
GLUCOSE UR QL: NEGATIVE
HGB UR QL STRIP: NEGATIVE
KETONES UR QL STRIP: NEGATIVE
LEUKOCYTE ESTERASE UR QL STRIP: NEGATIVE
MICRO URNS: NORMAL
MICRO URNS: NORMAL
NITRITE UR QL STRIP: NEGATIVE
PH UR STRIP: 7 [PH] (ref 5–7.5)
PROT UR QL STRIP: NEGATIVE
RBC #/AREA URNS HPF: NORMAL /HPF (ref 0–2)
SP GR UR: 1.01 (ref 1–1.03)
URINALYSIS REFLEX: NORMAL
UROBILINOGEN UR STRIP-MCNC: 0.2 MG/DL (ref 0.2–1)
WBC #/AREA URNS HPF: NORMAL /HPF (ref 0–5)

## 2021-06-23 ENCOUNTER — TELEPHONE (OUTPATIENT)
Dept: INTERNAL MEDICINE | Facility: CLINIC | Age: 41
End: 2021-06-23

## 2021-06-23 NOTE — TELEPHONE ENCOUNTER
----- Message from Reema Montenegro MA sent at 6/23/2021  3:44 PM EDT -----  Regarding: FW: Test Results Question  Contact: [phone]    ----- Message -----  From: Nicole Birch  Sent: 6/23/2021   2:50 PM EDT  To: Bell Carbajal [facility]  Subject: Test Results Question                            Hello, I received my test results. Is there anything I need to do differently based on these results?    Thank You

## 2021-06-24 RX ORDER — LANOLIN ALCOHOL/MO/W.PET/CERES
1000 CREAM (GRAM) TOPICAL DAILY
Qty: 90 TABLET | Refills: 3 | Status: SHIPPED | OUTPATIENT
Start: 2021-06-24 | End: 2021-07-27 | Stop reason: SDUPTHER

## 2021-06-24 RX ORDER — MULTIVIT-MIN/IRON/FOLIC ACID/K 18-600-40
50 CAPSULE ORAL DAILY
Qty: 90 CAPSULE | Refills: 3 | Status: SHIPPED | OUTPATIENT
Start: 2021-06-24 | End: 2021-07-23 | Stop reason: SDUPTHER

## 2021-07-16 ENCOUNTER — OFFICE VISIT (OUTPATIENT)
Dept: INTERNAL MEDICINE | Facility: CLINIC | Age: 41
End: 2021-07-16

## 2021-07-16 VITALS — HEIGHT: 68 IN | WEIGHT: 122 LBS | BODY MASS INDEX: 18.49 KG/M2

## 2021-07-16 DIAGNOSIS — F41.9 ANXIOUS MOOD: Primary | ICD-10-CM

## 2021-07-16 DIAGNOSIS — G47.00 INSOMNIA, UNSPECIFIED TYPE: ICD-10-CM

## 2021-07-16 PROCEDURE — 99214 OFFICE O/P EST MOD 30 MIN: CPT | Performed by: INTERNAL MEDICINE

## 2021-07-16 RX ORDER — FLUOXETINE HYDROCHLORIDE 20 MG/1
20 CAPSULE ORAL 3 TIMES DAILY
COMMUNITY
End: 2022-05-12

## 2021-07-16 NOTE — PROGRESS NOTES
Chief Complaint   Patient presents with   • Animal Bite     follow up       History of Present Illness   Nicole Birch is a 41 y.o. female presents for follow up evaluation. Patient w/ anxiety related to recent animal attack. She notes that her appetite has improved. She has been cleared to use her ankle in full function at this time. She is now walking and even small stints of running.   In addition to improvement with appetite she feels less intrusive thoughts.  Some continued difficulty with sleep. She would like to be off clonazepam. She has reduced clonazepam to 1/2 tab at night and now has been waking in the middle of the night. Can be related to tinnitus as well as children waking etc.   C/o dull headache. Bilateral top of head. Intermittent but notices most every day. Recently started a       The following portions of the patient's history were reviewed and updated as appropriate: allergies, current medications, past family history, past medical history, past social history, past surgical history and problem list.  Current Outpatient Medications on File Prior to Visit   Medication Sig Dispense Refill   • clonazePAM (KlonoPIN) 0.5 MG tablet      • FLUoxetine (PROzac) 20 MG capsule Take 20 mg by mouth 3 (Three) Times a Day.     • FLUoxetine (PROzac) 40 MG capsule 60 mg.     • loratadine (CLARITIN) 10 MG tablet Take 10 mg by mouth At Night As Needed.     • Probiotic Product (PROBIOTIC DAILY PO) Take 1 capsule by mouth Daily.     • vitamin B-12 (CYANOCOBALAMIN) 1000 MCG tablet Take 1 tablet by mouth Daily. 90 tablet 3   • Vitamin D, Cholecalciferol, 50 MCG (2000 UT) capsule Take 50 mcg by mouth Daily. 90 capsule 3   • docusate sodium 100 MG capsule Take 1 capsule by mouth 2 (Two) Times a Day. 60 capsule 0   • ondansetron (ZOFRAN) 4 MG tablet Take 1 tablet by mouth Every 6 (Six) Hours As Needed for Nausea. 20 tablet 0     No current facility-administered medications on file prior to visit.     Review of Systems  "  Constitutional: Negative.    HENT: Negative.    Eyes: Negative.    Respiratory: Negative.    Cardiovascular: Negative.    Gastrointestinal: Negative.    Endocrine: Negative.    Genitourinary: Negative.    Musculoskeletal:        Ankle pain improving     Skin: Negative.    Allergic/Immunologic: Negative.    Neurological: Negative.    Hematological: Negative.    Psychiatric/Behavioral: Negative.        Objective   Physical Exam  Vitals and nursing note reviewed.   Constitutional:       Appearance: Normal appearance.   HENT:      Head: Normocephalic and atraumatic.      Right Ear: Tympanic membrane normal.      Left Ear: Tympanic membrane normal.      Nose: Nose normal.      Mouth/Throat:      Mouth: Mucous membranes are moist.   Eyes:      Extraocular Movements: Extraocular movements intact.      Pupils: Pupils are equal, round, and reactive to light.   Cardiovascular:      Rate and Rhythm: Normal rate and regular rhythm.      Pulses: Normal pulses.      Heart sounds: Normal heart sounds.   Pulmonary:      Effort: Pulmonary effort is normal.      Breath sounds: Normal breath sounds.   Abdominal:      General: Abdomen is flat.      Palpations: Abdomen is soft.   Musculoskeletal:         General: Normal range of motion.      Cervical back: Normal range of motion.   Skin:     General: Skin is warm and dry.   Neurological:      General: No focal deficit present.      Mental Status: She is alert and oriented to person, place, and time.   Psychiatric:         Mood and Affect: Mood normal.         Behavior: Behavior normal.         Thought Content: Thought content normal.         Judgment: Judgment normal.          Ht 172.7 cm (68\")   Wt 55.3 kg (122 lb)   BMI 18.55 kg/m²     Assessment/Plan   Diagnoses and all orders for this visit:    Anxious mood    Insomnia, unspecified type    Other orders  -     FLUoxetine (PROzac) 20 MG capsule; Take 20 mg by mouth 3 (Three) Times a Day.        Patient with post traumatic anxiety. " She is doing well. Encouraged to continue current medications and when it is time to reduce meds she will want to do this slowly. Advised using white noise machine for rest. Will add therapy for headache and TMJ. She will also discuss headache w/ psychiatry. Labs reviewed and wnl. She will continue b12 and vit D supplementation at this. She will follow up here in 3 months or prn.          Answers for HPI/ROS submitted by the patient on 7/14/2021  Please describe your symptoms.: Follow up appointment to my last physical  Have you had these symptoms before?: No  How long have you been having these symptoms?: Greater than 2 weeks  Please list any medications you are currently taking for this condition.: Prozac, Clanazapham, B3  Please describe any probable cause for these symptoms. : Dog attack on March 18th  What is the primary reason for your visit?: Other

## 2021-07-25 RX ORDER — MULTIVIT-MIN/IRON/FOLIC ACID/K 18-600-40
50 CAPSULE ORAL DAILY
Qty: 90 CAPSULE | Refills: 3 | Status: SHIPPED | OUTPATIENT
Start: 2021-07-25

## 2021-07-27 ENCOUNTER — TELEPHONE (OUTPATIENT)
Dept: INTERNAL MEDICINE | Facility: CLINIC | Age: 41
End: 2021-07-27

## 2021-07-27 RX ORDER — LANOLIN ALCOHOL/MO/W.PET/CERES
1000 CREAM (GRAM) TOPICAL DAILY
Qty: 90 TABLET | Refills: 3 | Status: SHIPPED | OUTPATIENT
Start: 2021-07-27

## 2021-07-27 NOTE — TELEPHONE ENCOUNTER
Caller: [facility] - [place], [place] - [address] - [phone] [facility] [phone] FX    Relationship: Pharmacy    Best call back number: [phone]  Medication needed:   Requested Prescriptions     Pending Prescriptions Disp Refills   • vitamin B-12 (CYANOCOBALAMIN) 1000 MCG tablet 90 tablet 3     Sig: Take 1 tablet by mouth Daily.       When do you need the refill by: AS SOON AS POSSIBLE    What additional details did the patient provide when requesting the medication: BUDDY STATES A REQUEST SENT OVER ON 7/22/21 VIA FAX. UNSURE IF PATIENT IS OUT OF MEDICATION OR HOW MUCH IS LEFT     Does the patient have less than a 3 day supply:  [] Yes  [] No    What is the patient's preferred pharmacy:    [facility] - [place], [place] - [address] - [phone] [facility] [phone] FX  [phone]

## 2021-10-25 ENCOUNTER — OFFICE VISIT (OUTPATIENT)
Dept: INTERNAL MEDICINE | Facility: CLINIC | Age: 41
End: 2021-10-25

## 2021-10-25 VITALS
OXYGEN SATURATION: 98 % | TEMPERATURE: 97.5 F | DIASTOLIC BLOOD PRESSURE: 65 MMHG | HEIGHT: 68 IN | BODY MASS INDEX: 20.92 KG/M2 | HEART RATE: 55 BPM | WEIGHT: 138 LBS | SYSTOLIC BLOOD PRESSURE: 105 MMHG

## 2021-10-25 DIAGNOSIS — F41.9 ANXIETY: Primary | ICD-10-CM

## 2021-10-25 PROCEDURE — 99213 OFFICE O/P EST LOW 20 MIN: CPT | Performed by: INTERNAL MEDICINE

## 2021-10-25 NOTE — PROGRESS NOTES
"Chief Complaint   Patient presents with   • Anxiety     3 month follow up        History of Present Illness   Nicole Birch is a 41 y.o. female presents for follow up evaluation. Patient has anxiety related to traumatic event. She was experiencing intrusive thoughts and this has improved. In general she is feeling much less anxious. She is on 60 mg prozac at this time. In general this is well tolerated. However, she is noting anorgasmia as well as weight gain on 60 mg prozac. She has clonazepam available but has not 'wanted to take\" when feeling particularly anxious.       The following portions of the patient's history were reviewed and updated as appropriate: allergies, current medications, past family history, past medical history, past social history, past surgical history and problem list.  Current Outpatient Medications on File Prior to Visit   Medication Sig Dispense Refill   • docusate sodium 100 MG capsule Take 1 capsule by mouth 2 (Two) Times a Day. 60 capsule 0   • FLUoxetine (PROzac) 20 MG capsule Take 20 mg by mouth 3 (Three) Times a Day.     • FLUoxetine (PROzac) 40 MG capsule 60 mg.     • loratadine (CLARITIN) 10 MG tablet Take 10 mg by mouth At Night As Needed.     • magnesium oxide (MAGOX) 400 (241.3 Mg) MG tablet tablet Take 200 mg by mouth Daily.     • ondansetron (ZOFRAN) 4 MG tablet Take 1 tablet by mouth Every 6 (Six) Hours As Needed for Nausea. 20 tablet 0   • Probiotic Product (PROBIOTIC DAILY PO) Take 1 capsule by mouth Daily.     • vitamin B-12 (CYANOCOBALAMIN) 1000 MCG tablet Take 1 tablet by mouth Daily. 90 tablet 3   • Vitamin D, Cholecalciferol, 50 MCG (2000 UT) capsule Take 50 mcg by mouth Daily. 90 capsule 3   • clonazePAM (KlonoPIN) 0.5 MG tablet        No current facility-administered medications on file prior to visit.     Review of Systems   Constitutional: Negative.    HENT: Negative.    Eyes: Negative.    Respiratory: Negative.    Cardiovascular: Negative.    Gastrointestinal: " "Negative.    Endocrine: Negative.    Genitourinary: Negative.    Musculoskeletal: Negative.    Skin: Negative.    Allergic/Immunologic: Negative.    Neurological: Negative.    Hematological: Negative.    Psychiatric/Behavioral: Negative.        Objective   Physical Exam  Vitals and nursing note reviewed.   Constitutional:       Appearance: Normal appearance. She is well-developed.   HENT:      Head: Normocephalic and atraumatic.      Right Ear: Tympanic membrane and external ear normal.      Left Ear: Tympanic membrane and external ear normal.      Nose: Nose normal.      Mouth/Throat:      Mouth: Mucous membranes are moist.   Eyes:      Extraocular Movements: Extraocular movements intact.      Pupils: Pupils are equal, round, and reactive to light.   Cardiovascular:      Rate and Rhythm: Normal rate and regular rhythm.      Heart sounds: Normal heart sounds.   Pulmonary:      Effort: Pulmonary effort is normal. No respiratory distress.      Breath sounds: Normal breath sounds.   Abdominal:      General: Abdomen is flat.      Palpations: Abdomen is soft.   Musculoskeletal:         General: Normal range of motion.      Cervical back: Normal range of motion and neck supple.   Skin:     General: Skin is warm and dry.   Neurological:      General: No focal deficit present.      Mental Status: She is alert and oriented to person, place, and time.   Psychiatric:         Mood and Affect: Mood normal.         Behavior: Behavior normal.         Thought Content: Thought content normal.         Judgment: Judgment normal.          /65 (BP Location: Left arm, Patient Position: Sitting, Cuff Size: Adult)   Pulse 55   Temp 97.5 °F (36.4 °C) (Temporal)   Ht 172.7 cm (67.99\")   Wt 62.6 kg (138 lb)   SpO2 98%   BMI 20.99 kg/m²     Assessment/Plan   Diagnoses and all orders for this visit:    Anxiety    Other orders  -     magnesium oxide (MAGOX) 400 (241.3 Mg) MG tablet tablet; Take 200 mg by mouth Daily.      Patient w/ " anxiety. Discussed at length anxiety, triggers, and tools for when feeling anxious. If needed, she is to take 1/2-1 tab clonazepam. She will continue mag for sleep. She is to continue fitness, meditation, journaling, etc. She will f/u w/ therapist monthly and will discuss reducing prozac w/ her psychiatrist given concerns of sexual dysfunction and weight changes. She will f/u here in

## 2021-11-23 ENCOUNTER — IMMUNIZATION (OUTPATIENT)
Dept: VACCINE CLINIC | Facility: HOSPITAL | Age: 41
End: 2021-11-23

## 2021-11-23 ENCOUNTER — APPOINTMENT (OUTPATIENT)
Dept: WOMENS IMAGING | Facility: HOSPITAL | Age: 41
End: 2021-11-23

## 2021-11-23 PROCEDURE — 91300 HC SARSCOV02 VAC 30MCG/0.3ML IM: CPT | Performed by: INTERNAL MEDICINE

## 2021-11-23 PROCEDURE — 0004A ADM SARSCOV2 30MCG/0.3ML BOOSTER: CPT | Performed by: INTERNAL MEDICINE

## 2021-11-23 PROCEDURE — 77067 SCR MAMMO BI INCL CAD: CPT | Performed by: RADIOLOGY

## 2021-11-23 PROCEDURE — 77063 BREAST TOMOSYNTHESIS BI: CPT | Performed by: RADIOLOGY

## 2021-12-30 ENCOUNTER — APPOINTMENT (OUTPATIENT)
Dept: WOMENS IMAGING | Facility: HOSPITAL | Age: 41
End: 2021-12-30

## 2021-12-30 PROCEDURE — 77065 DX MAMMO INCL CAD UNI: CPT | Performed by: RADIOLOGY

## 2021-12-30 PROCEDURE — 76641 ULTRASOUND BREAST COMPLETE: CPT | Performed by: RADIOLOGY

## 2021-12-30 PROCEDURE — 77061 BREAST TOMOSYNTHESIS UNI: CPT | Performed by: RADIOLOGY

## 2021-12-30 PROCEDURE — G0279 TOMOSYNTHESIS, MAMMO: HCPCS | Performed by: RADIOLOGY

## 2022-03-04 ENCOUNTER — APPOINTMENT (OUTPATIENT)
Dept: WOMENS IMAGING | Facility: HOSPITAL | Age: 42
End: 2022-03-04

## 2022-03-04 PROCEDURE — 76642 ULTRASOUND BREAST LIMITED: CPT | Performed by: RADIOLOGY

## 2022-03-04 PROCEDURE — 77065 DX MAMMO INCL CAD UNI: CPT | Performed by: RADIOLOGY

## 2022-03-04 PROCEDURE — 77061 BREAST TOMOSYNTHESIS UNI: CPT | Performed by: RADIOLOGY

## 2022-03-04 PROCEDURE — G0279 TOMOSYNTHESIS, MAMMO: HCPCS | Performed by: RADIOLOGY

## 2022-03-17 ENCOUNTER — TELEPHONE (OUTPATIENT)
Dept: SURGERY | Facility: CLINIC | Age: 42
End: 2022-03-17

## 2022-03-17 NOTE — TELEPHONE ENCOUNTER
New Patient Appointment Scheduled with Luz Marina Pratt NP on 9/20/2022 @ 10:30am, Arrive @ 10:00am.    Called Pt and LM for her to call back to confirm    New Patient Packet sent in mail

## 2022-05-12 ENCOUNTER — OFFICE VISIT (OUTPATIENT)
Dept: INTERNAL MEDICINE | Facility: CLINIC | Age: 42
End: 2022-05-12

## 2022-05-12 VITALS
SYSTOLIC BLOOD PRESSURE: 108 MMHG | TEMPERATURE: 98.7 F | WEIGHT: 142 LBS | HEIGHT: 68 IN | BODY MASS INDEX: 21.52 KG/M2 | HEART RATE: 80 BPM | DIASTOLIC BLOOD PRESSURE: 68 MMHG | OXYGEN SATURATION: 97 %

## 2022-05-12 DIAGNOSIS — J01.10 ACUTE NON-RECURRENT FRONTAL SINUSITIS: Primary | ICD-10-CM
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
## 2022-05-12 PROBLEM — S91.052A DOG BITE OF ANKLE, LEFT, INITIAL ENCOUNTER: Status: RESOLVED | Noted: 2021-04-26 | Resolved: 2022-05-12
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
## 2022-05-12 PROCEDURE — 99213 OFFICE O/P EST LOW 20 MIN: CPT | Performed by: NURSE PRACTITIONER

## 2022-05-12 RX ORDER — DOXYCYCLINE 100 MG/1
100 CAPSULE ORAL 2 TIMES DAILY
Qty: 20 CAPSULE | Refills: 0 | Status: SHIPPED | OUTPATIENT
Start: 2022-05-12 | End: 2022-06-03

## 2022-05-12 RX ORDER — FLUOXETINE 10 MG/1
10 CAPSULE ORAL DAILY
COMMUNITY

## 2022-05-12 NOTE — PROGRESS NOTES
Subjective   Nicole Birch is a 42 y.o. female.   Chief Complaint   Patient presents with   • Sinusitis     For the last week   • Insect Bite     To left posterior and proximal thigh     Vitals:    05/12/22 1355   BP: 108/68   Pulse: 80   Temp: 98.7 °F (37.1 °C)   SpO2: 97%     No LMP recorded. (Menstrual status: Other).    Nicole is a 42 year old female patient of Dr Eli who is here for an acute visit. She c/o tick bite to the left thigh that she noticed over the weekend. She states her  possible pulled a tick off of her. She denies fever, chills. She states she has muscle aches but she works out regularly. She also has had sinus congestion as well.     Sinus Problem  This is a new problem. The current episode started 1 to 4 weeks ago. The problem has been gradually worsening since onset. There has been no fever. She is experiencing no pain. Associated symptoms include congestion, coughing, headaches and sinus pressure (when bending over ). Pertinent negatives include no ear pain or shortness of breath. Treatments tried: claritin , flonase  The treatment provided mild relief.        The following portions of the patient's history were reviewed and updated as appropriate: allergies, current medications, past family history, past medical history, past social history, past surgical history and problem list.    Review of Systems   Constitutional: Negative for fatigue and fever.   HENT: Positive for congestion and sinus pressure (when bending over ). Negative for ear pain.    Respiratory: Positive for cough. Negative for chest tightness and shortness of breath.    Cardiovascular: Negative.    Musculoskeletal: Positive for myalgias. Negative for arthralgias.   Neurological: Positive for headaches.       Objective   Physical Exam  Vitals and nursing note reviewed.   Constitutional:       General: She is not in acute distress.     Appearance: She is well-developed and well-groomed.   HENT:      Head:  Normocephalic.      Right Ear: Tympanic membrane normal.      Left Ear: Tympanic membrane normal.      Nose: Mucosal edema and rhinorrhea present.      Right Sinus: Frontal sinus tenderness present. No maxillary sinus tenderness.      Left Sinus: Frontal sinus tenderness present. No maxillary sinus tenderness.      Mouth/Throat:      Pharynx: Oropharynx is clear.   Skin:            Comments: Quarter sized round area of erythema with mild induration, there is a scab in the middle  Non tender  No bullseye rash seen    Neurological:      Mental Status: She is alert.         Assessment & Plan   Diagnoses and all orders for this visit:    1. Acute non-recurrent frontal sinusitis (Primary)    2. Tick bite, unspecified site, initial encounter    Other orders  -     doxycycline (MONODOX) 100 MG capsule; Take 1 capsule by mouth 2 (Two) Times a Day.  Dispense: 20 capsule; Refill: 0      Continue flonase and claritin   Start doxycycline  Warm compresses to sinuses  Tylenol or motrin as needed  Ok to apply hydrocortisone to insect bite  Avoid scratching  Follow if if symptoms persist, worsen or if new symptoms develop

## 2022-05-24 RX ORDER — CEPHALEXIN 500 MG/1
500 CAPSULE ORAL 2 TIMES DAILY
Qty: 14 CAPSULE | Refills: 0 | Status: SHIPPED | OUTPATIENT
Start: 2022-05-24 | End: 2022-06-03

## 2022-05-24 RX ORDER — CEPHALEXIN 500 MG/1
500 CAPSULE ORAL 2 TIMES DAILY
Qty: 14 CAPSULE | Refills: 0 | Status: SHIPPED | OUTPATIENT
Start: 2022-05-24 | End: 2022-05-24 | Stop reason: SDUPTHER

## 2022-05-26 RX ORDER — FLUCONAZOLE 150 MG/1
150 TABLET ORAL ONCE
Qty: 2 TABLET | Refills: 0 | Status: SHIPPED | OUTPATIENT
Start: 2022-05-26 | End: 2022-05-26

## 2022-06-03 ENCOUNTER — OFFICE VISIT (OUTPATIENT)
Dept: INTERNAL MEDICINE | Facility: CLINIC | Age: 42
End: 2022-06-03

## 2022-06-03 VITALS
HEART RATE: 53 BPM | SYSTOLIC BLOOD PRESSURE: 98 MMHG | HEIGHT: 68 IN | WEIGHT: 143 LBS | DIASTOLIC BLOOD PRESSURE: 58 MMHG | BODY MASS INDEX: 21.67 KG/M2
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
## 2022-06-03 DIAGNOSIS — F41.9 ANXIETY: Primary | ICD-10-CM

## 2022-06-03 DIAGNOSIS — S30.860D: ICD-10-CM

## 2022-06-03 PROCEDURE — 99214 OFFICE O/P EST MOD 30 MIN: CPT | Performed by: INTERNAL MEDICINE

## 2022-06-03 RX ORDER — FLUCONAZOLE 150 MG/1
150 TABLET ORAL ONCE
Qty: 2 TABLET | Refills: 0 | Status: SHIPPED | OUTPATIENT
Start: 2022-06-03 | End: 2022-06-03

## 2022-06-03 RX ORDER — CEPHALEXIN 500 MG/1
500 CAPSULE ORAL 2 TIMES DAILY
Qty: 14 CAPSULE | Refills: 0 | Status: SHIPPED | OUTPATIENT
Start: 2022-06-03 | End: 2022-06-20

## 2022-06-03 RX ORDER — HYDROXYZINE HYDROCHLORIDE 25 MG/1
25 TABLET, FILM COATED ORAL EVERY 8 HOURS PRN
Qty: 30 TABLET | Refills: 2 | Status: SHIPPED | OUTPATIENT
Start: 2022-06-03

## 2022-06-03 NOTE — PROGRESS NOTES
Chief Complaint   Patient presents with   • Insect Bite   • Weight Gain   • Anxiety       History of Present Illness   Nicole Birch is a 42 y.o. female presents for follow up evaluation. She is actually doing well today. She notes improved sleep w/ healthy nutrition and healthy movement. She had ptsd from a dog attack. Was on prozac at 60 mg that has been reduced per her psychiatrist to 10 mg. She has clonazepam available but notes that she feels overly sedated w/ this medication.       The following portions of the patient's history were reviewed and updated as appropriate: allergies, current medications, past family history, past medical history, past social history, past surgical history and problem list.  Current Outpatient Medications on File Prior to Visit   Medication Sig Dispense Refill   • clonazePAM (KlonoPIN) 0.5 MG tablet      • FLUoxetine (PROzac) 10 MG capsule Take 10 mg by mouth Daily.     • loratadine (CLARITIN) 10 MG tablet Take 10 mg by mouth At Night As Needed.     • magnesium oxide (MAGOX) 400 (241.3 Mg) MG tablet tablet Take 200 mg by mouth Daily.     • mupirocin (BACTROBAN) 2 % ointment Apply 1 application topically to the appropriate area as directed 3 (Three) Times a Day. 22 g 0   • Probiotic Product (PROBIOTIC DAILY PO) Take 1 capsule by mouth Daily.     • vitamin B-12 (CYANOCOBALAMIN) 1000 MCG tablet Take 1 tablet by mouth Daily. 90 tablet 3   • Vitamin D, Cholecalciferol, 50 MCG (2000 UT) capsule Take 50 mcg by mouth Daily. 90 capsule 3   • [DISCONTINUED] cephalexin (Keflex) 500 MG capsule Take 1 capsule by mouth 2 (Two) Times a Day. 14 capsule 0   • [DISCONTINUED] docusate sodium 100 MG capsule Take 1 capsule by mouth 2 (Two) Times a Day. 60 capsule 0   • [DISCONTINUED] doxycycline (MONODOX) 100 MG capsule Take 1 capsule by mouth 2 (Two) Times a Day. 20 capsule 0   • [DISCONTINUED] ondansetron (ZOFRAN) 4 MG tablet Take 1 tablet by mouth Every 6 (Six) Hours As Needed for Nausea. 20  "tablet 0     No current facility-administered medications on file prior to visit.     Review of Systems   Constitutional: Negative for fatigue and fever.   HENT: Negative for congestion, rhinorrhea and sore throat.    Eyes: Negative for pain.   Respiratory: Negative for cough and shortness of breath.    Gastrointestinal: Negative for diarrhea and vomiting.   Skin: Positive for rash.       Objective   Physical Exam  Vitals and nursing note reviewed.   Constitutional:       Appearance: Normal appearance. She is well-developed.   HENT:      Head: Normocephalic and atraumatic.      Right Ear: Tympanic membrane and external ear normal.      Left Ear: Tympanic membrane and external ear normal.      Nose: Nose normal.      Mouth/Throat:      Mouth: Mucous membranes are moist.   Eyes:      Extraocular Movements: Extraocular movements intact.      Pupils: Pupils are equal, round, and reactive to light.   Cardiovascular:      Rate and Rhythm: Normal rate and regular rhythm.      Heart sounds: Normal heart sounds.   Pulmonary:      Effort: Pulmonary effort is normal. No respiratory distress.      Breath sounds: Normal breath sounds.   Abdominal:      General: Abdomen is flat.      Palpations: Abdomen is soft.   Musculoskeletal:         General: Normal range of motion.      Cervical back: Normal range of motion and neck supple.   Skin:     General: Skin is warm and dry.      Comments: Inflamed skin buttock bite reaction w/ mild surrounding erythema   Neurological:      General: No focal deficit present.      Mental Status: She is alert and oriented to person, place, and time.   Psychiatric:         Mood and Affect: Mood normal.         Behavior: Behavior normal.         Thought Content: Thought content normal.         Judgment: Judgment normal.          BP 98/58   Pulse 53   Ht 172.7 cm (68\")   Wt 64.9 kg (143 lb)   BMI 21.74 kg/m²     Assessment & Plan   Diagnoses and all orders for this visit:    Anxiety    Insect bite of " buttock with local reaction, subsequent encounter    Other orders  -     hydrOXYzine (ATARAX) 25 MG tablet; Take 1 tablet by mouth Every 8 (Eight) Hours As Needed for Anxiety.  -     cephalexin (Keflex) 500 MG capsule; Take 1 capsule by mouth 2 (Two) Times a Day.  -     fluconazole (Diflucan) 150 MG tablet; Take 1 tablet by mouth 1 (One) Time for 1 dose. Repeat after 72 hours    patiet w/ insect bite w/ local reaction. Will do topical care w/ dial soap/ mupirocin ointment. Unroofed the area and no foreign body present. She is to cover at night to avoid scratch. She may start keflex if area of involvement increases. Will take diflucan w/ this if needed.   For anxiety will remain on lexapro 10 mg daily. She will maintain a healthy diet w/ routine fitness. She will limit weight checks and focus on good health. She will try hydroxyzine prn anxious/ panic symptoms. F/u prn.              Answers for HPI/ROS submitted by the patient on 6/2/2022  What is the primary reason for your visit?: Rash

## 2022-06-09 DIAGNOSIS — E03.9 HYPOTHYROIDISM, UNSPECIFIED TYPE: Primary | ICD-10-CM

## 2022-06-16 LAB
ALBUMIN SERPL-MCNC: 4.6 G/DL (ref 3.8–4.8)
ALBUMIN/GLOB SERPL: 2.9 {RATIO} (ref 1.2–2.2)
ALP SERPL-CCNC: 59 IU/L (ref 44–121)
ALT SERPL-CCNC: 18 IU/L (ref 0–32)
APPEARANCE UR: CLEAR
AST SERPL-CCNC: 18 IU/L (ref 0–40)
BACTERIA #/AREA URNS HPF: NORMAL /[HPF]
BASOPHILS # BLD AUTO: 0 X10E3/UL (ref 0–0.2)
BASOPHILS NFR BLD AUTO: 1 %
BILIRUB SERPL-MCNC: 0.5 MG/DL (ref 0–1.2)
BILIRUB UR QL STRIP: NEGATIVE
BUN SERPL-MCNC: 8 MG/DL (ref 6–24)
BUN/CREAT SERPL: 10 (ref 9–23)
CALCIUM SERPL-MCNC: 9.3 MG/DL (ref 8.7–10.2)
CASTS URNS QL MICRO: NORMAL /LPF
CHLORIDE SERPL-SCNC: 101 MMOL/L (ref 96–106)
CHOLEST SERPL-MCNC: 223 MG/DL (ref 100–199)
CO2 SERPL-SCNC: 24 MMOL/L (ref 20–29)
COLOR UR: YELLOW
CREAT SERPL-MCNC: 0.77 MG/DL (ref 0.57–1)
EGFRCR SERPLBLD CKD-EPI 2021: 98 ML/MIN/1.73
EOSINOPHIL # BLD AUTO: 0.2 X10E3/UL (ref 0–0.4)
EOSINOPHIL NFR BLD AUTO: 4 %
EPI CELLS #/AREA URNS HPF: NORMAL /HPF (ref 0–10)
ERYTHROCYTE [DISTWIDTH] IN BLOOD BY AUTOMATED COUNT: 11.8 % (ref 11.7–15.4)
GLOBULIN SER CALC-MCNC: 1.6 G/DL (ref 1.5–4.5)
GLUCOSE SERPL-MCNC: 76 MG/DL (ref 65–99)
GLUCOSE UR QL STRIP: NEGATIVE
HCT VFR BLD AUTO: 41.9 % (ref 34–46.6)
HDLC SERPL-MCNC: 72 MG/DL
HGB BLD-MCNC: 13.8 G/DL (ref 11.1–15.9)
HGB UR QL STRIP: NEGATIVE
IMM GRANULOCYTES # BLD AUTO: 0 X10E3/UL (ref 0–0.1)
IMM GRANULOCYTES NFR BLD AUTO: 0 %
KETONES UR QL STRIP: ABNORMAL
LDLC SERPL CALC-MCNC: 137 MG/DL (ref 0–99)
LEUKOCYTE ESTERASE UR QL STRIP: NEGATIVE
LYMPHOCYTES # BLD AUTO: 1.6 X10E3/UL (ref 0.7–3.1)
LYMPHOCYTES NFR BLD AUTO: 31 %
MCH RBC QN AUTO: 31.8 PG (ref 26.6–33)
MCHC RBC AUTO-ENTMCNC: 32.9 G/DL (ref 31.5–35.7)
MCV RBC AUTO: 97 FL (ref 79–97)
MICRO URNS: ABNORMAL
MICRO URNS: ABNORMAL
MONOCYTES # BLD AUTO: 0.5 X10E3/UL (ref 0.1–0.9)
MONOCYTES NFR BLD AUTO: 9 %
NEUTROPHILS # BLD AUTO: 2.9 X10E3/UL (ref 1.4–7)
NEUTROPHILS NFR BLD AUTO: 55 %
NITRITE UR QL STRIP: NEGATIVE
PH UR STRIP: 6.5 [PH] (ref 5–7.5)
PLATELET # BLD AUTO: 186 X10E3/UL (ref 150–450)
POTASSIUM SERPL-SCNC: 3.9 MMOL/L (ref 3.5–5.2)
PROT SERPL-MCNC: 6.2 G/DL (ref 6–8.5)
PROT UR QL STRIP: ABNORMAL
RBC # BLD AUTO: 4.34 X10E6/UL (ref 3.77–5.28)
RBC #/AREA URNS HPF: NORMAL /HPF (ref 0–2)
SODIUM SERPL-SCNC: 139 MMOL/L (ref 134–144)
SP GR UR STRIP: 1.02 (ref 1–1.03)
TRIGL SERPL-MCNC: 78 MG/DL (ref 0–149)
TSH SERPL DL<=0.005 MIU/L-ACNC: 2.87 UIU/ML (ref 0.45–4.5)
URINALYSIS REFLEX: ABNORMAL
UROBILINOGEN UR STRIP-MCNC: 0.2 MG/DL (ref 0.2–1)
VLDLC SERPL CALC-MCNC: 14 MG/DL (ref 5–40)
WBC # BLD AUTO: 5.2 X10E3/UL (ref 3.4–10.8)
WBC #/AREA URNS HPF: NORMAL /HPF (ref 0–5)

## 2022-06-20 ENCOUNTER — OFFICE VISIT (OUTPATIENT)
Dept: INTERNAL MEDICINE | Facility: CLINIC | Age: 42
End: 2022-06-20

## 2022-06-20 VITALS
HEIGHT: 68 IN | HEART RATE: 50 BPM | BODY MASS INDEX: 22.13 KG/M2 | SYSTOLIC BLOOD PRESSURE: 98 MMHG | DIASTOLIC BLOOD PRESSURE: 54 MMHG | WEIGHT: 146 LBS

## 2022-06-20 DIAGNOSIS — Z00.00 HEALTHCARE MAINTENANCE: Primary | ICD-10-CM

## 2022-06-20 DIAGNOSIS — R63.5 WEIGHT GAIN: ICD-10-CM

## 2022-06-20 PROCEDURE — 99396 PREV VISIT EST AGE 40-64: CPT | Performed by: INTERNAL MEDICINE

## 2022-06-20 RX ORDER — CARBOXYMETHYLCELLULOSE/CITRIC 0.75 G
3 CAPSULE ORAL 2 TIMES DAILY
Qty: 60 CAPSULE | Refills: 4 | Status: SHIPPED | OUTPATIENT
Start: 2022-06-20 | End: 2022-06-21 | Stop reason: SDUPTHER

## 2022-06-20 NOTE — PROGRESS NOTES
Subjective   CPE  Weight challenges.   Anxiety    Nicole Birch is a 42 y.o. female who presents for a complete physical exam, to review chronic issues, and to address acute needs. Cbc, cmp, lipid, tsh, u/a reviewed w/ patient. LDL-c is now elevated from prior. She reports a very clean diet w/ routine fitness. Working out w/ a , power walking, spin class, etc. She notes that her father and paternal aunts and uncles have cad. Brother is on a statin.   Weight concerns. Previous hypothyroidism. Now euthyroid. Prior thyroid antibody levels normal/ negative. She stops eating at 7 pm. Does not eat again until 11 am.               Review of Systems   Constitutional: Negative.    HENT: Negative.    Eyes: Negative.    Respiratory: Negative.    Cardiovascular: Negative.    Gastrointestinal: Negative.    Endocrine: Negative.    Genitourinary: Negative.    Musculoskeletal: Negative.    Allergic/Immunologic: Negative.    Neurological: Negative.    Hematological: Negative.    Psychiatric/Behavioral: Negative.         The following portions of the patient's history were reviewed and updated as appropriate: allergies, current medications, past family history, past medical history, past social history, past surgical history and problem list.     Patient Active Problem List   Diagnosis   • Axillary mass   • Hypothyroidism   • Vitamin D deficiency disease       Past Medical History:   Diagnosis Date   • Dog bite of lower leg     right ankle    • Hypothyroidism        Past Surgical History:   Procedure Laterality Date   • INCISION AND DRAINAGE LEG Right 4/28/2021    Procedure: right ankle/Achilles tendon irrigation, debridement/saucerization of calcaneus;  Surgeon: Thuan Perez Jr., MD;  [place] [facility] [place] [facility] [place]: [place]opedics;  Laterality: Right;   • WISDOM TOOTH EXTRACTION         Family History   Problem Relation Age of Onset   • Cancer Mother 67        lung    • Heart disease Father    • Malig Hyperthermia Neg Hx   "      Social History     Socioeconomic History   • Marital status:    Tobacco Use   • Smoking status: Never Smoker   • Smokeless tobacco: Never Used   Vaping Use   • Vaping Use: Never used   Substance and Sexual Activity   • Alcohol use: Yes     Comment: occasionally, prior to pregnancy   • Drug use: No   • Sexual activity: Defer     Partners: Male       Current Outpatient Medications on File Prior to Visit   Medication Sig Dispense Refill   • clonazePAM (KlonoPIN) 0.5 MG tablet      • FLUoxetine (PROzac) 10 MG capsule Take 10 mg by mouth Daily.     • hydrOXYzine (ATARAX) 25 MG tablet Take 1 tablet by mouth Every 8 (Eight) Hours As Needed for Anxiety. 30 tablet 2   • loratadine (CLARITIN) 10 MG tablet Take 10 mg by mouth At Night As Needed.     • magnesium oxide (MAGOX) 400 (241.3 Mg) MG tablet tablet Take 200 mg by mouth Daily.     • Probiotic Product (PROBIOTIC DAILY PO) Take 1 capsule by mouth Daily.     • vitamin B-12 (CYANOCOBALAMIN) 1000 MCG tablet Take 1 tablet by mouth Daily. 90 tablet 3   • Vitamin D, Cholecalciferol, 50 MCG (2000 UT) capsule Take 50 mcg by mouth Daily. 90 capsule 3   • mupirocin (BACTROBAN) 2 % ointment Apply 1 application topically to the appropriate area as directed 3 (Three) Times a Day. 22 g 0   • [DISCONTINUED] cephalexin (Keflex) 500 MG capsule Take 1 capsule by mouth 2 (Two) Times a Day. 14 capsule 0     No current facility-administered medications on file prior to visit.       No Known Allergies    Immunization History   Administered Date(s) Administered   • COVID-19 (MODERNA) 1st, 2nd, 3rd Dose Only 03/17/2021, 04/14/2021   • COVID-19 (PFIZER) PURPLE CAP 11/23/2021   • Influenza Quad Vaccine (Inpatient) 09/15/2017   • Influenza, Unspecified 09/14/2020   • Tdap 07/07/2017       Objective     BP 98/54   Pulse 50   Ht 172.7 cm (68\")   Wt 66.2 kg (146 lb)   BMI 22.20 kg/m²     Physical Exam  Vitals and nursing note reviewed.   Constitutional:       Appearance: Normal " appearance.   HENT:      Head: Normocephalic and atraumatic.      Right Ear: Tympanic membrane normal.      Left Ear: Tympanic membrane normal.      Nose: Nose normal.      Mouth/Throat:      Mouth: Mucous membranes are moist.   Eyes:      Extraocular Movements: Extraocular movements intact.      Pupils: Pupils are equal, round, and reactive to light.   Cardiovascular:      Rate and Rhythm: Normal rate and regular rhythm.      Pulses: Normal pulses.      Heart sounds: Normal heart sounds.   Pulmonary:      Effort: Pulmonary effort is normal.      Breath sounds: Normal breath sounds.   Abdominal:      General: Abdomen is flat.      Palpations: Abdomen is soft.   Musculoskeletal:         General: Normal range of motion.      Cervical back: Normal range of motion.   Skin:     General: Skin is warm and dry.   Neurological:      General: No focal deficit present.      Mental Status: She is alert and oriented to person, place, and time.   Psychiatric:         Mood and Affect: Mood normal.         Behavior: Behavior normal.         Thought Content: Thought content normal.         Judgment: Judgment normal.         Assessment & Plan   Diagnoses and all orders for this visit:    1. Healthcare maintenance (Primary)    2. Weight gain    Other orders  -     Carboxymeth-Cellulose-CitricAc (Plenity) capsule; Take 3 tablets by mouth 2 (Two) Times a Day.  Dispense: 60 capsule; Refill: 4    Patient presents for routine hcm. She is doing well today. She is current on eye and dental. She is to schedule a dermatology visit. She is to continue vitamin d and b12 supplementation. She has weight concerns. Will continue a healthy diet that is low in cholesterol and carbohydrates. She will continue healthy physcial activity. Will test bmp lipid in 6 months as well to track lipid levels.     Discussion  We discussed the following classes of obesity medications.      Phentermine:  It is a stimulant.  It can increase blood pressure and cause  palpitations.  Rare side effects include cardiac ischemia (lack of blood flow to heart), pulmonary HTN, psychosis, abuse and dependency and withdrawal.  It is a controlled substance in the [facility].      Contrave (naltrexone/buproprion).  This is a combination drug.  Bupropion is an antidepressant.  Naltrexone is an opoid blocker.       Qsymia (phentermine/topiramate).  One component phentermine see above.  Topiramate is an antiseizure medication.  Common side effects of topiramate include kidney stones, paresthesias and difficulty with word finding.      Saxenda, Wegovy.  Active ingredient is also in diabetes medication.  It is a daily injection.  It works by activating the GLP-1 receptor in the brain, regulating appetite and caloric intake.  Contraindicated if you have family history of thyroid cancer.  Common reaction is nausea.     Patient presents today for a CPE.      Patient follows a healthy diet.   Patient follows an adequate exercise regimen. Mammogram is up to date.   Pap smears are performed by the patient's gynecologist.   Immunizations are up to date.           Future Appointments   Date Time Provider Department Center   9/20/2022 10:30 AM Luz Marina Pratt APRN [facility]

## 2022-06-21 ENCOUNTER — TELEPHONE (OUTPATIENT)
Dept: INTERNAL MEDICINE | Facility: CLINIC | Age: 42
End: 2022-06-21

## 2022-06-21 RX ORDER — CARBOXYMETHYLCELLULOSE/CITRIC 0.75 G
3 CAPSULE ORAL 2 TIMES DAILY
Qty: 60 CAPSULE | Refills: 4 | Status: SHIPPED | OUTPATIENT
Start: 2022-06-21 | End: 2023-01-03

## 2022-06-21 NOTE — TELEPHONE ENCOUNTER
PHARMACY CALLED TO LET YOU KNOW THAT THE ONLY PLACE THAT CAN FILL THIS RX IS     [facility], [place] - [address]  [phone]  - [phone] [facility]  [phone]      PLEASE CALL /ADVISE   [facility] [id number] - [place], [place] - [address] AT [facility] - [phone]  - [phone] FX (Pharmacy) [phone]       OR PATIENT    1: Home: [phone]

## 2022-08-02 ENCOUNTER — TELEMEDICINE (OUTPATIENT)
Dept: INTERNAL MEDICINE | Facility: CLINIC | Age: 42
End: 2022-08-02

## 2022-08-02 ENCOUNTER — TELEPHONE (OUTPATIENT)
Dept: INTERNAL MEDICINE | Facility: CLINIC | Age: 42
End: 2022-08-02

## 2022-08-02 DIAGNOSIS — U07.1 COVID-19 VIRUS DETECTED: Primary | ICD-10-CM

## 2022-08-02 PROCEDURE — 99213 OFFICE O/P EST LOW 20 MIN: CPT | Performed by: INTERNAL MEDICINE

## 2022-08-02 RX ORDER — GUAIFENESIN AND CODEINE PHOSPHATE 100; 10 MG/5ML; MG/5ML
5 SOLUTION ORAL 3 TIMES DAILY PRN
Qty: 125 ML | Refills: 0 | Status: SHIPPED | OUTPATIENT
Start: 2022-08-02 | End: 2023-01-03

## 2022-08-02 RX ORDER — BENZONATATE 100 MG/1
100 CAPSULE ORAL 3 TIMES DAILY PRN
Qty: 30 CAPSULE | Refills: 3 | Status: SHIPPED | OUTPATIENT
Start: 2022-08-02 | End: 2023-01-03

## 2022-08-02 NOTE — PROGRESS NOTES
No chief complaint on file.      History of Present Illness   Nicole Birch is a 42 y.o. female presents for acute care. She is seen by video in place of office visit due to COVID-19 pandemic/ infection. Patient reports a one day history of body aches and headache. She tested today and is positive for covid. She took advil and now symptoms are minimal. She is in good health and engaged in a rigorous workout yesterday w/ good tolerance. She has a minimal cough and drainage at this time.       The following portions of the patient's history were reviewed and updated as appropriate: allergies, current medications, past family history, past medical history, past social history, past surgical history and problem list.  Current Outpatient Medications on File Prior to Visit   Medication Sig Dispense Refill   • Carboxymeth-Cellulose-CitricAc (Plenity) capsule Take 3 tablets by mouth 2 (Two) Times a Day. 60 capsule 4   • clonazePAM (KlonoPIN) 0.5 MG tablet      • FLUoxetine (PROzac) 10 MG capsule Take 10 mg by mouth Daily.     • hydrOXYzine (ATARAX) 25 MG tablet Take 1 tablet by mouth Every 8 (Eight) Hours As Needed for Anxiety. 30 tablet 2   • loratadine (CLARITIN) 10 MG tablet Take 10 mg by mouth At Night As Needed.     • magnesium oxide (MAGOX) 400 (241.3 Mg) MG tablet tablet Take 200 mg by mouth Daily.     • mupirocin (BACTROBAN) 2 % ointment Apply 1 application topically to the appropriate area as directed 3 (Three) Times a Day. 22 g 0   • Probiotic Product (PROBIOTIC DAILY PO) Take 1 capsule by mouth Daily.     • vitamin B-12 (CYANOCOBALAMIN) 1000 MCG tablet Take 1 tablet by mouth Daily. 90 tablet 3   • Vitamin D, Cholecalciferol, 50 MCG (2000 UT) capsule Take 50 mcg by mouth Daily. 90 capsule 3     No current facility-administered medications on file prior to visit.     Review of Systems   Constitutional: Positive for fatigue and fever.   HENT: Positive for postnasal drip.    Eyes: Negative.    Respiratory: Positive  for cough. Negative for shortness of breath and wheezing.    Cardiovascular: Negative.    Gastrointestinal: Negative.    Endocrine: Negative.    Genitourinary: Negative.    Musculoskeletal: Positive for arthralgias and myalgias.   Allergic/Immunologic: Negative.    Neurological: Positive for headaches.        Mild headache   Hematological: Negative.    Psychiatric/Behavioral: Negative.        Objective   Physical Exam  Constitutional:       Appearance: Normal appearance.   HENT:      Head: Normocephalic and atraumatic.      Mouth/Throat:      Mouth: Mucous membranes are moist.   Eyes:      Extraocular Movements: Extraocular movements intact.   Pulmonary:      Effort: Pulmonary effort is normal.   Musculoskeletal:         General: Normal range of motion.      Cervical back: Normal range of motion.   Neurological:      General: No focal deficit present.      Mental Status: She is alert and oriented to person, place, and time.   Psychiatric:         Mood and Affect: Mood normal.         Behavior: Behavior normal.         Thought Content: Thought content normal.         Judgment: Judgment normal.          There were no vitals taken for this visit.    Assessment & Plan   Diagnoses and all orders for this visit:    COVID-19 virus detected  -     guaiFENesin-codeine (GUAIFENESIN AC) 100-10 MG/5ML liquid; Take 5 mL by mouth 3 (Three) Times a Day As Needed for Cough.    Other orders  -     benzonatate (Tessalon Perles) 100 MG capsule; Take 1 capsule by mouth 3 (Three) Times a Day As Needed for Cough.      Patient w/ acute covid 19 infection. Discussed w/ patient risks v benefits of paxlovid therapy. As she has a low risk for poor outcome from virus and minimal symptoms that are well managed w/ ibuprofen do not feel med is indicated at this time. She will take either tessalon or cheratussin prn cough. Continue regular allergy therapy w/ flonase/ antihistamine for symptom relief. She is to quarantine per cdc protocol. To ER if  respiratory compromise. Total visit 20 min w/ >505 spent counseling patient.

## 2022-08-02 NOTE — TELEPHONE ENCOUNTER
Caller: Nicole Birch    Relationship to patient: Self    Best call back number: [phone]    Date of positive COVID19 test: 08.02.22 AT HOME TEST     Date if possible COVID19 exposure: UNKNOWN     COVID19 symptoms: MUSCLE ACHES, COLD CHILLS, MILD HEADACHE    Date of initial quarantine: 08.02.22    Additional information or concerns: PATIENT WOULD LIKE TO KNOW IS DR. STARK RECOMMENDS THE ANTI VIRAL MEDICATION OR WHAT DR. STARK RECOMMENDS PATIENT TO DO SINCE SHE HAS TESTED POSITIVE FOR COVID.     What is the patients preferred pharmacy: [facility] [address] [place] [address] RD AT [facility] [phone] [facility] [phone] FX

## 2022-09-13 ENCOUNTER — APPOINTMENT (OUTPATIENT)
Dept: WOMENS IMAGING | Facility: HOSPITAL | Age: 42
End: 2022-09-13

## 2022-09-13 PROCEDURE — 76641 ULTRASOUND BREAST COMPLETE: CPT | Performed by: RADIOLOGY

## 2022-09-20 ENCOUNTER — TELEPHONE (OUTPATIENT)
Dept: SURGERY | Facility: CLINIC | Age: 42
End: 2022-09-20

## 2022-09-20 ENCOUNTER — OFFICE VISIT (OUTPATIENT)
Dept: SURGERY | Facility: CLINIC | Age: 42
End: 2022-09-20

## 2022-09-20 VITALS
WEIGHT: 140 LBS | BODY MASS INDEX: 21.22 KG/M2 | OXYGEN SATURATION: 98 % | HEART RATE: 54 BPM | DIASTOLIC BLOOD PRESSURE: 60 MMHG | SYSTOLIC BLOOD PRESSURE: 124 MMHG | HEIGHT: 68 IN

## 2022-09-20 DIAGNOSIS — R92.8 ABNORMAL FINDING ON BREAST IMAGING: Primary | ICD-10-CM

## 2022-09-20 DIAGNOSIS — R92.2 BREAST DENSITY: ICD-10-CM

## 2022-09-20 DIAGNOSIS — N64.52 NIPPLE DISCHARGE: ICD-10-CM

## 2022-09-20 DIAGNOSIS — Z12.31 ENCOUNTER FOR SCREENING MAMMOGRAM FOR MALIGNANT NEOPLASM OF BREAST: ICD-10-CM

## 2022-09-20 PROBLEM — R92.30 BREAST DENSITY: Status: ACTIVE | Noted: 2022-09-20

## 2022-09-20 PROCEDURE — 99214 OFFICE O/P EST MOD 30 MIN: CPT | Performed by: NURSE PRACTITIONER

## 2022-09-20 NOTE — PROGRESS NOTES
[facility]     Referring Provider: Cielo AGUIRRE     Chief complaint: nipple discharge     HPI: Ms. Nicole Birch is a 43 yo woman, seen at the request of TIMOTHY Santana, for Rt nipple discharge    I personally reviewed her records and summarized her relevant breast history/imagin2019 Jim screening mammo at [facility]  Breast are heterogeneously dense.  There are stable round and punctuate calcifications with diffuse scattered distribution seen in both breast.  No suspicious masses, suspicious microcalcifications or architectural distortions are identified.  There is been no significant change from the prior exam.  Impression  Stable calcifications in both breast are benign negative.  BI-RADS 2    2020 bilateral mammography at [facility]  The breast are heterogeneously dense.  No suspicious masses, significant calcifications or other abnormalities are seen.  Impression  There is no mammographic evidence of malignancy.  BI-RADS 1    2021 bilateral screening mammogram with Rahul at [facility]  The breasts are heterogeneously dense.  There is a new high density asymmetry measuring 8 mm with indistinct margins seen in the posterior one third central region of the right breast.  This is best visualized on the Rahul MLO view slice #23.  This is only seen on the MLO view.  In the left breast, no suspicious masses, significant calcifications or other abnormalities are seen.  Impression  New asymmetry in the right breast requires additional evaluation.  Spot compression and ultrasound are recommended.  BI-RADS Category 0    2021 right breast digital diagnostic mammogram with Rahul and complete right breast ultrasound at [facility]  The breast is heterogeneously dense.  Finding 1-additional evaluation was performed for the asymmetry in the right breast, central seen on 2001.  On the present examination, asymmetry in the right breast, central does not persist.  The asymmetry noted on the recent  "screening mammogram resolves into stroma on additional views.  Ultrasound  Finding 1 there is no sonographic correlate.  There is no evidence of any solid mass or abnormal cystic elements.  Finding 2-there is an oval small anechoic septated cyst with well-defined, thin margins measuring 4 x 3 x 4 mm seen in the posterior one third subareolar region of the right breast.  No solid or suspicious sonographic abnormalities are seen.  Finding 3-there is an oval anechoic simple cyst with well-defined, thin margins measuring 5 mm seen in the 1030 o'clock region of the right breast.  Impression  Finding 1-area in the right breast is benign negative.  Finding 2-septated cyst in the posterior one third subareolar region of the right breast is probably benign.  Follow-up ultrasound of the right breast in 6 months is recommended.    Finding 3-simple cyst in the 1030 o'clock region of the right breast is benign negative.  BI-RADS 3    2/9/2022 Saw Cielo AGUIRRE  \" The patient is a 41-year-old female who presents with a chief complaint of nipple discharge.  The onset of the breast nipple discharge has been sudden has been occurring a persistent pattern for 4 days.  The course has been constant.  The breast nipple discharge is characterized as thick and sticky brown and yellow.  There are no changes to the nipple.  On the right breast only.\"    3/4/2022 right breast diagnostic mammogram with Rahul and right breast complete ultrasound at [facility]  The breast is heterogeneously dense.  Finding 1-there is no suspicious finding in region of clinical concern.  There is been no significant change from the prior exam.  Finding 2-there are a few small asymmetry seen in the several scattered location region of both breast.  Ultrasound  Finding 1-there is no sonographic correlate.  No discrete or suspicious sonographic abnormality is seen.  Finding 2-ultrasound demonstrates a few oval small simple cyst and complicated cyst seen in the several " scattered location regions of both breast.  These measure less than 5 mm.  Impression  Finding 1-areas in both breast are benign negative.  In view of the negative findings the patient's symptoms need to be followed on an ongoing clinical basis.  Finding 2 simple cysts and complicated cyst in both breast are probably benign.  Follow-up ultrasound exam in 6 months is recommended.  BI-RADS 3    9/13/2022 bilateral complete breast ultrasound at [facility]  Follow-up examination was performed for the asymmetries in both breast, several scattered locations seen on 3/4/2022.  On the present examination, there are multiple similar oval small simple cyst with debris containing cyst with well-defined, thin margins and several scattered location regions of both breast.  These measure less than 1 cm.  No internal vascularity identified by Doppler.  Findings have become better defined as cyst compared to the prior exam.  No solid or suspicious sonographic abnormality is seen.  Impression  Simple cyst and debris containing cyst in both breast are benign negative.    Screening mammogram in 3 months is recommended.  BI-RADS 2    She has a personal history of  hypothyroidism    She denies any family history of breast or ovarian cancer.     Today she presents with concern regarding nipple discharge. Noticed right nipple discharge 6 months ago while doing a breast exam. Since then she can express a discharge with squeezing nipple.  White discharge. Single duct.    She denies any breast lumps, pain or skin changes.  She denies any prior history of breast biopsies.    She was by herself in clinic today.       Review of Systems   Constitutional: Positive for diaphoresis and unexpected weight change.        Weight gain   HENT:   Positive for tinnitus.    Psychiatric/Behavioral: The patient is nervous/anxious.    All other systems reviewed and are negative.      Medications:    Current Outpatient Medications:   •  benzonatate (Tessalon Perles)  100 MG capsule, Take 1 capsule by mouth 3 (Three) Times a Day As Needed for Cough., Disp: 30 capsule, Rfl: 3  •  Carboxymeth-Cellulose-CitricAc (Plenity) capsule, Take 3 tablets by mouth 2 (Two) Times a Day., Disp: 60 capsule, Rfl: 4  •  clonazePAM (KlonoPIN) 0.5 MG tablet, , Disp: , Rfl:   •  FLUoxetine (PROzac) 10 MG capsule, Take 10 mg by mouth Daily., Disp: , Rfl:   •  guaiFENesin-codeine (GUAIFENESIN AC) 100-10 MG/5ML liquid, Take 5 mL by mouth 3 (Three) Times a Day As Needed for Cough., Disp: 125 mL, Rfl: 0  •  hydrOXYzine (ATARAX) 25 MG tablet, Take 1 tablet by mouth Every 8 (Eight) Hours As Needed for Anxiety., Disp: 30 tablet, Rfl: 2  •  loratadine (CLARITIN) 10 MG tablet, Take 10 mg by mouth At Night As Needed., Disp: , Rfl:   •  magnesium oxide (MAGOX) 400 (241.3 Mg) MG tablet tablet, Take 200 mg by mouth Daily., Disp: , Rfl:   •  mupirocin (BACTROBAN) 2 % ointment, Apply 1 application topically to the appropriate area as directed 3 (Three) Times a Day., Disp: 22 g, Rfl: 0  •  Probiotic Product (PROBIOTIC DAILY PO), Take 1 capsule by mouth Daily., Disp: , Rfl:   •  vitamin B-12 (CYANOCOBALAMIN) 1000 MCG tablet, Take 1 tablet by mouth Daily., Disp: 90 tablet, Rfl: 3  •  Vitamin D, Cholecalciferol, 50 MCG (2000 UT) capsule, Take 50 mcg by mouth Daily., Disp: 90 capsule, Rfl: 3    Allergies:  No Known Allergies    Medical history:  Past Medical History:   Diagnosis Date   • Dog bite of lower leg     right ankle    • Hypothyroidism        Surgical History:  Past Surgical History:   Procedure Laterality Date   • INCISION AND DRAINAGE LEG Right 4/28/2021    Procedure: right ankle/Achilles tendon irrigation, debridement/saucerization of calcaneus;  Surgeon: Thuan Perez Jr., MD;  [place] [facility] [place] [facility] [place]  Service: [place]opedics;  Laterality: Right;   • WISDOM TOOTH EXTRACTION         Family History:  Family History   Problem Relation Age of Onset   • Cancer Mother 67        lung    • Heart disease Father    •  Malig Hyperthermia Neg Hx        Social History:   Social History     Socioeconomic History   • Marital status:    Tobacco Use   • Smoking status: Never Smoker   • Smokeless tobacco: Never Used   Vaping Use   • Vaping Use: Never used   Substance and Sexual Activity   • Alcohol use: Yes     Comment: occasionally, prior to pregnancy   • Drug use: No   • Sexual activity: Defer     Partners: Male     Patient drinks 2 servings of caffeine per day.       GYNECOLOGIC HISTORY:   G: 3. P: 3. AB: 0.  Last menstrual period: 09/10/2022  Age at menarche: 11  Age at first childbirth: 38  Lactation/How lon year  Age at menopause: n/a  Total years of oral contraceptive use: never  Total years of hormone replacement therapy: never      Physical Exam  Vitals:    22 1041   BP: 124/60   Pulse: 54   SpO2: 98%     ECOG 0 - Asymptomatic  General: NAD, well appearing  Psych: a&o x 3, normal mood and affect  Eyes: EOMI, no scleral icterus  ENMT: neck supple without masses or thyromegaly, mucus membranes moist  Resp: normal effort, CTAB  CV: RRR, no murmurs, no edema   GI: soft, NT, ND  MSK: normal gait, normal ROM in bilateral shoulders  Lymph nodes:  no cervical, supraclavicular or axillary lymphadenopathy  Breast: symmetric, small  Right: No visible abnormalities on inspection while seated, with arms raised or hands on hips. No masses, skin changes, or nipple abnormalities.  Left: No visible abnormalities on inspection while seated, with arms raised or hands on hips. No masses, skin changes, or nipple abnormalities.      Assessment:    1)  Abnormal imaging- surveillance ended 22  2. Nipple discharge  3. Breast density    Discussion:  1. I explained the concept of a BI-RADS 3 designation and the process of imaging surveillance. We discussed that a BI-RADS 3 designation describes an imaging finding that is 98-99% likely to represent a benign process. We discussed that the most common management of a BI-RADS 3 finding  is initial 6 month imaging surveillance and that the entire period of imaging surveillance can often take 2 years. She is no longer in surveillance at this time.  2. We discussed types of nipple discharge. We discussed that physiologic discharge is usually bilateral, nonspontaneous, multi-ductal, and milky, but can be yellow, green or brown. That this can sometimes be associated with elevated prolactin levels and that elevated prolactin levels can have multiple causes (pregnancy, pituitary, thyroid, medications, etc).    We discussed that pathologic nipple discharge usually is unilateral, spontaneous, persistent, comes from a single duct, is bloody, clear, or serosanguinous, or is associated with a palpable or radiological evident breast mass.   We will get a breast MRI to look for a mammographically or sonographically occult lesion. If MRI finds a lesion, she may require a second-look US or MR-biopsy. If MRI is negative, then will proceed with ductogram.   3. Breast density describes how the breasts look on a mammogram.  Breast and connective tissue are denser than fat and this difference shows up on the mammogram.  Young women often have dense breasts.  As we age, breast become less dense.  Dense breast can make it harder to find breast cancer on the mammogram.  Women with high breast density have an increased risk of breast cancer.  Educational materials regarding breast density were given and reviewed.  Tomosynthesis imaging will be completed with next screening study.      Plan:  1. MRI in near future. Call with results  2. Screening mammo in Dec 2022 followed by exam  3. Cont monthly self breast exams  4. rto if any new concerns     TIMOTHY Hernandez have spent 50 min in face to face time with the patient and in chart review.    CC:  No ref. provider found  Shayla Eli MD    EMR Dragon/transcription disclaimer:  Dictated using Dragon dictation

## 2022-09-20 NOTE — TELEPHONE ENCOUNTER
Pt was given her appts at the time of her visit today.  Breast MRI scheduled at [facility] on 10/10/2022 arrive at 1:15.  Annual exam and MMG at [facility] on 11/28/2023 at 10:20.  Follow up with TIMOTHY Hernandez on 01/03/2023 at 9:20.    CMA

## 2022-10-10 ENCOUNTER — HOSPITAL ENCOUNTER (OUTPATIENT)
Dept: MRI IMAGING | Facility: HOSPITAL | Age: 42
Discharge: HOME | End: 2022-10-10
Admitting: NURSE PRACTITIONER

## 2022-10-10 PROCEDURE — 0 GADOBENATE DIMEGLUMINE 529 MG/ML SOLUTION: Performed by: NURSE PRACTITIONER

## 2022-10-10 PROCEDURE — 77049 MRI BREAST C-+ W/CAD BI: CPT

## 2022-10-10 PROCEDURE — A9577 INJ MULTIHANCE: HCPCS | Performed by: NURSE PRACTITIONER

## 2022-10-10 RX ADMIN — GADOBENATE DIMEGLUMINE 13 ML: 529 INJECTION, SOLUTION INTRAVENOUS at 14:08

## 2022-10-11 ENCOUNTER — TELEPHONE (OUTPATIENT)
Dept: SURGERY | Facility: CLINIC | Age: 42
End: 2022-10-11

## 2022-10-11 NOTE — TELEPHONE ENCOUNTER
Called and gave BIRADS 1 MRI results.  She is to follow up with me in Jan. Mammo in Dec. Pt verbalized understanding

## 2022-11-28 ENCOUNTER — APPOINTMENT (OUTPATIENT)
Dept: WOMENS IMAGING | Facility: HOSPITAL | Age: 42
End: 2022-11-28

## 2022-11-28 PROCEDURE — 77067 SCR MAMMO BI INCL CAD: CPT | Performed by: RADIOLOGY

## 2022-11-28 PROCEDURE — 77063 BREAST TOMOSYNTHESIS BI: CPT | Performed by: RADIOLOGY

## 2022-12-27 NOTE — PROGRESS NOTES
[facility]     Referring Provider: Cielo AGUIRRE     Chief complaint: nipple discharge     HPI: Ms. Nicole Birch is a 41 yo woman, seen at the request of TIMOTHY Santana, for Rt nipple discharge    I personally reviewed her records and summarized her relevant breast history/imagin2019 Jim screening mammo at [facility]  Breast are heterogeneously dense.  There are stable round and punctuate calcifications with diffuse scattered distribution seen in both breast.  No suspicious masses, suspicious microcalcifications or architectural distortions are identified.  There is been no significant change from the prior exam.  Impression  Stable calcifications in both breast are benign negative.  BI-RADS 2    2020 bilateral mammography at [facility]  The breast are heterogeneously dense.  No suspicious masses, significant calcifications or other abnormalities are seen.  Impression  There is no mammographic evidence of malignancy.  BI-RADS 1    2021 bilateral screening mammogram with Rahul at [facility]  The breasts are heterogeneously dense.  There is a new high density asymmetry measuring 8 mm with indistinct margins seen in the posterior one third central region of the right breast.  This is best visualized on the Rahul MLO view slice #23.  This is only seen on the MLO view.  In the left breast, no suspicious masses, significant calcifications or other abnormalities are seen.  Impression  New asymmetry in the right breast requires additional evaluation.  Spot compression and ultrasound are recommended.  BI-RADS Category 0    2021 right breast digital diagnostic mammogram with Rahul and complete right breast ultrasound at [facility]  The breast is heterogeneously dense.  Finding 1-additional evaluation was performed for the asymmetry in the right breast, central seen on 2001.  On the present examination, asymmetry in the right breast, central does not persist.  The asymmetry noted on the recent  screening mammogram resolves into stroma on additional views.  Ultrasound  Finding 1 there is no sonographic correlate.  There is no evidence of any solid mass or abnormal cystic elements.  Finding 2-there is an oval small anechoic septated cyst with well-defined, thin margins measuring 4 x 3 x 4 mm seen in the posterior one third subareolar region of the right breast.  No solid or suspicious sonographic abnormalities are seen.  Finding 3-there is an oval anechoic simple cyst with well-defined, thin margins measuring 5 mm seen in the 1030 o'clock region of the right breast.  Impression  Finding 1-area in the right breast is benign negative.  Finding 2-septated cyst in the posterior one third subareolar region of the right breast is probably benign.  Follow-up ultrasound of the right breast in 6 months is recommended.    Finding 3-simple cyst in the 1030 o'clock region of the right breast is benign negative.  BI-RADS 3    2/9/2022 Saw Cielo AGUIRRE  \" The patient is a 41-year-old female who presents with a chief complaint of nipple discharge.  The onset of the breast nipple discharge has been sudden has been occurring a persistent pattern for 4 days.  The course has been constant.  The breast nipple discharge is characterized as thick and sticky brown and yellow.  There are no changes to the nipple.  On the right breast only.\"    3/4/2022 right breast diagnostic mammogram with Rahul and right breast complete ultrasound at [facility]  The breast is heterogeneously dense.  Finding 1-there is no suspicious finding in region of clinical concern.  There is been no significant change from the prior exam.  Finding 2-there are a few small asymmetry seen in the several scattered location region of both breast.  Ultrasound  Finding 1-there is no sonographic correlate.  No discrete or suspicious sonographic abnormality is seen.  Finding 2-ultrasound demonstrates a few oval small simple cyst and complicated cyst seen in the several  scattered location regions of both breast.  These measure less than 5 mm.  Impression  Finding 1-areas in both breast are benign negative.  In view of the negative findings the patient's symptoms need to be followed on an ongoing clinical basis.  Finding 2 simple cysts and complicated cyst in both breast are probably benign.  Follow-up ultrasound exam in 6 months is recommended.  BI-RADS 3    9/13/2022 bilateral complete breast ultrasound at [facility]  Follow-up examination was performed for the asymmetries in both breast, several scattered locations seen on 3/4/2022.  On the present examination, there are multiple similar oval small simple cyst with debris containing cyst with well-defined, thin margins and several scattered location regions of both breast.  These measure less than 1 cm.  No internal vascularity identified by Doppler.  Findings have become better defined as cyst compared to the prior exam.  No solid or suspicious sonographic abnormality is seen.  Impression  Simple cyst and debris containing cyst in both breast are benign negative.    Screening mammogram in 3 months is recommended.  BI-RADS 2    10/10/2022 breat MRI at [facility]  FINDINGS: There is heterogeneous fibroglandular tissue. There is mild  background parenchymal enhancement.  RIGHT BREAST:    No suspicious enhancing mass or area of non-mass enhancement is  identified.  The visualized axilla is within normal limits.   LEFT BREAST:    No suspicious enhancing mass or area of non-mass enhancement is  identified.  The visualized axilla is within normal limits.   EXTRAMAMMARY FINDINGS:   There are no pathologically enlarged internal mammary chain lymph nodes  on either side.     IMPRESSION AND RECOMMENDATION:  No MRI evidence of malignancy in either breast. Further management of  the patient's nipple discharge should be based on clinical assessment.  Otherwise, routine screening is recommended.   BI-RADS Category 1: Negative    11/2/2022 screening mamm[facility] at  [facility]  The breasts are heterogeneously dense.  There are stable areas of asymmetric breast tissue seen in both breast.  Scattered benign-appearing calcifications are noted.  No suspicious masses suspicious microcalcifications or architectural distortions are identified.  There is been no significant change from prior exam.  Impression  Stable areas of asymmetric breast tissue in both breast are benign negative.  Screening mammogram in 1 year is recommended.  BI-RADS Category 2    She has a personal history of hypothyroidism    She denies any family history of breast or ovarian cancer.     Today she presents with concern regarding nipple discharge. Noticed right nipple discharge 6 months ago while doing a breast exam. Since then she can express a discharge with squeezing nipple.  White discharge. Single duct.    She denies any breast lumps, pain or skin changes.  She denies any prior history of breast biopsies.    She was by herself in clinic today.     1/3/2023 Interval History  Patient is returning to the office today for routine follow-up. She has no new breast complaints today.  She has stopped expressing right nipple and the issue has completely resolved.  Not seeing any discharge on bra or bedding.   Imaging in order and all wnl    Review of Systems   Constitutional: Positive for diaphoresis and unexpected weight change.        Weight gain   HENT:   Positive for tinnitus.    Psychiatric/Behavioral: The patient is nervous/anxious.    All other systems reviewed and are negative.      Medications:    Current Outpatient Medications:   •  clonazePAM (KlonoPIN) 0.5 MG tablet, , Disp: , Rfl:   •  FLUoxetine (PROzac) 10 MG capsule, Take 10 mg by mouth Daily., Disp: , Rfl:   •  hydrOXYzine (ATARAX) 25 MG tablet, Take 1 tablet by mouth Every 8 (Eight) Hours As Needed for Anxiety., Disp: 30 tablet, Rfl: 2  •  loratadine (CLARITIN) 10 MG tablet, Take 10 mg by mouth At Night As Needed., Disp: , Rfl:   •  magnesium  oxide (MAGOX) 400 (241.3 Mg) MG tablet tablet, Take 200 mg by mouth Daily., Disp: , Rfl:   •  Probiotic Product (PROBIOTIC DAILY PO), Take 1 capsule by mouth Daily., Disp: , Rfl:   •  vitamin B-12 (CYANOCOBALAMIN) 1000 MCG tablet, Take 1 tablet by mouth Daily., Disp: 90 tablet, Rfl: 3  •  Vitamin D, Cholecalciferol, 50 MCG (2000 UT) capsule, Take 50 mcg by mouth Daily., Disp: 90 capsule, Rfl: 3    Allergies:  No Known Allergies    Medical history:  Past Medical History:   Diagnosis Date   • Dog bite of lower leg     right ankle    • Hypothyroidism        Surgical History:  Past Surgical History:   Procedure Laterality Date   • INCISION AND DRAINAGE LEG Right 2021    Procedure: right ankle/Achilles tendon irrigation, debridement/saucerization of calcaneus;  Surgeon: Thuan Perez Jr., MD;  Location: [facility] OR [facility];  Service: Orthopedics;  Laterality: Right;   • WISDOM TOOTH EXTRACTION         Family History:  Family History   Problem Relation Age of Onset   • Cancer Mother 67        lung    • Heart disease Father    • Malig Hyperthermia Neg Hx        Social History:   Social History     Socioeconomic History   • Marital status:    Tobacco Use   • Smoking status: Never   • Smokeless tobacco: Never   Vaping Use   • Vaping Use: Never used   Substance and Sexual Activity   • Alcohol use: Yes     Comment: occasionally, prior to pregnancy   • Drug use: No   • Sexual activity: Defer     Partners: Male     Patient drinks 2 servings of caffeine per day.       GYNECOLOGIC HISTORY:   G: 3. P: 3. AB: 0.  Last menstrual period: 09/10/2022  Age at menarche: 11  Age at first childbirth: 38  Lactation/How lon year  Age at menopause: n/a  Total years of oral contraceptive use: never  Total years of hormone replacement therapy: never      Physical Exam  Vitals:    23 1000   BP: 112/58   Pulse: 60   SpO2: 99%     ECOG 0 - Asymptomatic  General: NAD, well appearing  Psych: a&o x 3, normal mood and affect  Eyes:  EOMI, no scleral icterus  ENMT: neck supple without masses or thyromegaly, mucus membranes moist  Resp: normal effort, CTAB  CV: RRR, no murmurs, no edema   GI: soft, NT, ND  MSK: normal gait, normal ROM in bilateral shoulders  Lymph nodes:  no cervical, supraclavicular or axillary lymphadenopathy  Breast: symmetric, small  Right: No visible abnormalities on inspection while seated, with arms raised or hands on hips. No masses, skin changes, or nipple abnormalities.  Left: No visible abnormalities on inspection while seated, with arms raised or hands on hips. No masses, skin changes, or nipple abnormalities.      Assessment:    1)  Abnormal imaging- surveillance ended 9/13/22  2. Nipple discharge  3. Breast density    Discussion:  1. I explained the concept of a BI-RADS 3 designation and the process of imaging surveillance. We discussed that a BI-RADS 3 designation describes an imaging finding that is 98-99% likely to represent a benign process. We discussed that the most common management of a BI-RADS 3 finding is initial 6 month imaging surveillance and that the entire period of imaging surveillance can often take 2 years. She is no longer in surveillance at this time.  2. We discussed types of nipple discharge. We discussed that physiologic discharge is usually bilateral, nonspontaneous, multi-ductal, and milky, but can be yellow, green or brown. That this can sometimes be associated with elevated prolactin levels and that elevated prolactin levels can have multiple causes (pregnancy, pituitary, thyroid, medications, etc).    We discussed that pathologic nipple discharge usually is unilateral, spontaneous, persistent, comes from a single duct, is bloody, clear, or serosanguinous, or is associated with a palpable or radiological evident breast mass.   We will get a breast MRI to look for a mammographically or sonographically occult lesion. If MRI finds a lesion, she may require a second-look US or MR-biopsy. If  MRI is negative, then will proceed with ductogram.   3. Breast density describes how the breasts look on a mammogram.  Breast and connective tissue are denser than fat and this difference shows up on the mammogram.  Young women often have dense breasts.  As we age, breast become less dense.  Dense breast can make it harder to find breast cancer on the mammogram.  Women with high breast density have an increased risk of breast cancer.  Educational materials regarding breast density were given and reviewed.  Tomosynthesis imaging will be completed with next screening study.      Plan:  1. Screening mammo in Nov 2023, followed by exam- if normal can discharge from practice  2. Cont monthly self breast exams  3. rto if any new concerns or if discharge reappears    TIMOTHY Hernandez    I have spent 50 min in face to face time with the patient and in chart review.    CC:  No ref. provider found  Shayla Eli MD    EMR Dragon/transcription disclaimer:  Dictated using Dragon dictation

## 2022-12-28 DIAGNOSIS — E78.5 ELEVATED LIPIDS: Primary | ICD-10-CM

## 2023-01-03 ENCOUNTER — OFFICE VISIT (OUTPATIENT)
Dept: SURGERY | Facility: CLINIC | Age: 43
End: 2023-01-03
Payer: COMMERCIAL

## 2023-01-03 VITALS
DIASTOLIC BLOOD PRESSURE: 58 MMHG | WEIGHT: 140 LBS | HEART RATE: 60 BPM | SYSTOLIC BLOOD PRESSURE: 112 MMHG | OXYGEN SATURATION: 99 % | HEIGHT: 68 IN | BODY MASS INDEX: 21.22 KG/M2

## 2023-01-03 DIAGNOSIS — Z12.31 ENCOUNTER FOR SCREENING MAMMOGRAM FOR MALIGNANT NEOPLASM OF BREAST: ICD-10-CM

## 2023-01-03 DIAGNOSIS — N64.52 NIPPLE DISCHARGE: Primary | ICD-10-CM

## 2023-01-03 DIAGNOSIS — R92.2 BREAST DENSITY: ICD-10-CM

## 2023-01-03 PROCEDURE — 1159F MED LIST DOCD IN RCRD: CPT | Performed by: NURSE PRACTITIONER

## 2023-01-03 PROCEDURE — 99213 OFFICE O/P EST LOW 20 MIN: CPT | Performed by: NURSE PRACTITIONER

## 2023-01-03 PROCEDURE — 1160F RVW MEDS BY RX/DR IN RCRD: CPT | Performed by: NURSE PRACTITIONER

## 2023-01-06 LAB
ALBUMIN SERPL-MCNC: 4.7 G/DL (ref 3.5–5.2)
ALBUMIN/GLOB SERPL: 2.8 G/DL
ALP SERPL-CCNC: 62 U/L (ref 39–117)
ALT SERPL-CCNC: 21 U/L (ref 1–33)
AST SERPL-CCNC: 15 U/L (ref 1–32)
BILIRUB SERPL-MCNC: 0.5 MG/DL (ref 0–1.2)
BUN SERPL-MCNC: 15 MG/DL (ref 6–20)
BUN/CREAT SERPL: 18.1 (ref 7–25)
CALCIUM SERPL-MCNC: 9.4 MG/DL (ref 8.6–10.5)
CHLORIDE SERPL-SCNC: 104 MMOL/L (ref 98–107)
CHOLEST SERPL-MCNC: 224 MG/DL (ref 0–200)
CO2 SERPL-SCNC: 27.2 MMOL/L (ref 22–29)
CREAT SERPL-MCNC: 0.83 MG/DL (ref 0.57–1)
EGFRCR SERPLBLD CKD-EPI 2021: 89.8 ML/MIN/1.73
GLOBULIN SER CALC-MCNC: 1.7 GM/DL
GLUCOSE SERPL-MCNC: 85 MG/DL (ref 65–99)
HDLC SERPL-MCNC: 80 MG/DL (ref 40–60)
LDLC SERPL CALC-MCNC: 131 MG/DL (ref 0–100)
POTASSIUM SERPL-SCNC: 4.4 MMOL/L (ref 3.5–5.2)
PROT SERPL-MCNC: 6.4 G/DL (ref 6–8.5)
SODIUM SERPL-SCNC: 139 MMOL/L (ref 136–145)
TRIGL SERPL-MCNC: 75 MG/DL (ref 0–150)
VLDLC SERPL CALC-MCNC: 13 MG/DL (ref 5–40)

## 2023-01-13 ENCOUNTER — TRANSCRIBE ORDERS (OUTPATIENT)
Dept: ADMINISTRATIVE | Facility: HOSPITAL | Age: 43
End: 2023-01-13
Payer: COMMERCIAL

## 2023-01-13 ENCOUNTER — OFFICE VISIT (OUTPATIENT)
Dept: INTERNAL MEDICINE | Facility: CLINIC | Age: 43
End: 2023-01-13
Payer: COMMERCIAL

## 2023-01-13 VITALS
HEIGHT: 68 IN | WEIGHT: 141 LBS | SYSTOLIC BLOOD PRESSURE: 100 MMHG | HEART RATE: 54 BPM | DIASTOLIC BLOOD PRESSURE: 58 MMHG | BODY MASS INDEX: 21.37 KG/M2

## 2023-01-13 DIAGNOSIS — Z13.6 ENCOUNTER FOR SCREENING FOR VASCULAR DISEASE: Primary | ICD-10-CM

## 2023-01-13 DIAGNOSIS — Z82.49 FAMILY HISTORY OF EARLY CAD: ICD-10-CM

## 2023-01-13 DIAGNOSIS — F41.9 ANXIETY: Primary | ICD-10-CM

## 2023-01-13 PROCEDURE — 99213 OFFICE O/P EST LOW 20 MIN: CPT | Performed by: INTERNAL MEDICINE

## 2023-01-13 NOTE — PROGRESS NOTES
Chief Complaint   Patient presents with   • Anxiety   • Other     Elevated ldl         History of Present Illness   Nicole Birch is a 42 y.o. female presents for follow up evaluation. She is doing well today. She does have mildly elevated LDL today. She notes that father with CAD and brother w hyperlipdiemia. Patient adheres to a very healthy diet and fitness although less stringent during holidays.   Concerns of new skin finding on scalp.  Patient has history anxiety/ post traumatic event. She does well with prozac. Has not needed clonazepam in last year. Rare usage hydroxyzine. Magnesium prn insomnia.       The following portions of the patient's history were reviewed and updated as appropriate: allergies, current medications, past family history, past medical history, past social history, past surgical history and problem list.  Current Outpatient Medications on File Prior to Visit   Medication Sig Dispense Refill   • clonazePAM (KlonoPIN) 0.5 MG tablet      • FLUoxetine (PROzac) 10 MG capsule Take 10 mg by mouth Daily.     • hydrOXYzine (ATARAX) 25 MG tablet Take 1 tablet by mouth Every 8 (Eight) Hours As Needed for Anxiety. 30 tablet 2   • loratadine (CLARITIN) 10 MG tablet Take 10 mg by mouth At Night As Needed.     • magnesium oxide (MAGOX) 400 (241.3 Mg) MG tablet tablet Take 200 mg by mouth Daily.     • Probiotic Product (PROBIOTIC DAILY PO) Take 1 capsule by mouth Daily.     • vitamin B-12 (CYANOCOBALAMIN) 1000 MCG tablet Take 1 tablet by mouth Daily. 90 tablet 3   • Vitamin D, Cholecalciferol, 50 MCG (2000 UT) capsule Take 50 mcg by mouth Daily. 90 capsule 3     No current facility-administered medications on file prior to visit.     Review of Systems   Constitutional: Negative.    HENT: Negative.    Eyes: Negative.    Respiratory: Negative.    Cardiovascular: Negative.    Gastrointestinal: Negative.    Endocrine: Negative.    Genitourinary: Negative.    Musculoskeletal: Negative.    Skin: Negative.   "  Allergic/Immunologic: Negative.    Neurological: Negative.    Hematological: Negative.    Psychiatric/Behavioral: Negative.        Objective   Physical Exam  Vitals and nursing note reviewed.   Constitutional:       Appearance: Normal appearance. She is well-developed.   HENT:      Head: Normocephalic and atraumatic.      Right Ear: Tympanic membrane and external ear normal.      Left Ear: Tympanic membrane and external ear normal.      Nose: Nose normal.      Mouth/Throat:      Mouth: Mucous membranes are moist.   Eyes:      Extraocular Movements: Extraocular movements intact.      Pupils: Pupils are equal, round, and reactive to light.   Cardiovascular:      Rate and Rhythm: Normal rate and regular rhythm.      Pulses: Normal pulses.      Heart sounds: Normal heart sounds.   Pulmonary:      Effort: Pulmonary effort is normal. No respiratory distress.      Breath sounds: Normal breath sounds.   Abdominal:      General: Abdomen is flat.      Palpations: Abdomen is soft.   Musculoskeletal:         General: Normal range of motion.      Cervical back: Normal range of motion and neck supple.   Skin:     General: Skin is warm and dry.   Neurological:      General: No focal deficit present.      Mental Status: She is alert and oriented to person, place, and time.   Psychiatric:         Mood and Affect: Mood normal.         Behavior: Behavior normal.         Thought Content: Thought content normal.         Judgment: Judgment normal.          /58   Pulse 54   Ht 172.7 cm (68\")   Wt 64 kg (141 lb)   BMI 21.44 kg/m²     Assessment & Plan   Diagnoses and all orders for this visit:    Anxiety    Family history of early CAD      Patient w/ anxiety. She is doing well on fluoxetine. Has hydroxyzine v clonazepine available if needed. She is encouraged to use this as needed. Advise routine check in with therapist. She has fam h/o cad w/ ldl 131. She will get ct cardiac calcium screen. She will follow up in 7 months or " prn.

## 2023-03-15 ENCOUNTER — TELEPHONE (OUTPATIENT)
Dept: INTERNAL MEDICINE | Facility: CLINIC | Age: 43
End: 2023-03-15

## 2023-03-15 NOTE — TELEPHONE ENCOUNTER
Caller: Queta Nicole    Relationship: Self    Best call back number: [phone]    What is the best time to reach you: ANY TIME    Who are you requesting to speak with (clinical staff, provider,  specific staff member): CLINICAL STAFF    What was the call regarding: PATIENT WANTS TO KNOW IF DR. STARK WILL TAKE ON HER  (MICAELA SOLANO) AS A NEW PATIENT.  IF YES, PLEASE CALL TO SCHEDULE.    Do you require a callback: YES    PLEASE ADVISE.

## 2023-03-23 ENCOUNTER — HOSPITAL ENCOUNTER (OUTPATIENT)
Dept: CT IMAGING | Facility: HOSPITAL | Age: 43
Discharge: HOME | End: 2023-03-23

## 2023-03-23 ENCOUNTER — HOSPITAL ENCOUNTER (OUTPATIENT)
Dept: CARDIOLOGY | Facility: HOSPITAL | Age: 43
Discharge: HOME | End: 2023-03-23

## 2023-03-23 DIAGNOSIS — Z13.6 ENCOUNTER FOR SCREENING FOR VASCULAR DISEASE: ICD-10-CM

## 2023-03-23 LAB
BH CV XLRA MEAS - MID AO DIAM: 1.5 CM
BH CV XLRA MEAS - PAD LEFT ABI PT: 1.28
BH CV XLRA MEAS - PAD LEFT ARM: 89 MMHG
BH CV XLRA MEAS - PAD LEFT LEG PT: 127 MMHG
BH CV XLRA MEAS - PAD RIGHT ABI PT: 1.22
BH CV XLRA MEAS - PAD RIGHT ARM: 99 MMHG
BH CV XLRA MEAS - PAD RIGHT LEG PT: 121 MMHG
BH CV XLRA MEAS LEFT DIST CCA EDV: -35.4 CM/SEC
BH CV XLRA MEAS LEFT DIST CCA PSV: -124 CM/SEC
BH CV XLRA MEAS LEFT ICA/CCA RATIO: 1.1
BH CV XLRA MEAS LEFT MID CCA PSV: 124 CM/SEC
BH CV XLRA MEAS LEFT MID ICA PSV: 132 CM/SEC
BH CV XLRA MEAS LEFT PROX ICA EDV: -31.8 CM/SEC
BH CV XLRA MEAS LEFT PROX ICA PSV: -132 CM/SEC
BH CV XLRA MEAS RIGHT DIST CCA EDV: -31.7 CM/SEC
BH CV XLRA MEAS RIGHT DIST CCA PSV: -107 CM/SEC
BH CV XLRA MEAS RIGHT ICA/CCA RATIO: 1
BH CV XLRA MEAS RIGHT MID CCA PSV: 107 CM/SEC
BH CV XLRA MEAS RIGHT MID ICA PSV: 108 CM/SEC
BH CV XLRA MEAS RIGHT PROX ICA EDV: -42.2 CM/SEC
BH CV XLRA MEAS RIGHT PROX ICA PSV: -108 CM/SEC
MAXIMAL PREDICTED HEART RATE: 177 BPM
STRESS TARGET HR: 150 BPM

## 2023-03-23 PROCEDURE — 93799 UNLISTED CV SVC/PROCEDURE: CPT

## 2023-03-23 PROCEDURE — 75571 CT HRT W/O DYE W/CA TEST: CPT

## 2023-03-27 DIAGNOSIS — R91.1 PULMONARY NODULE: Primary | ICD-10-CM

## 2023-04-11 ENCOUNTER — OFFICE VISIT (OUTPATIENT)
Dept: INTERNAL MEDICINE | Facility: CLINIC | Age: 43
End: 2023-04-11
Payer: COMMERCIAL

## 2023-04-11 VITALS
BODY MASS INDEX: 21.29 KG/M2 | TEMPERATURE: 98.4 F | HEART RATE: 76 BPM | DIASTOLIC BLOOD PRESSURE: 76 MMHG | SYSTOLIC BLOOD PRESSURE: 114 MMHG | WEIGHT: 140 LBS | OXYGEN SATURATION: 98 %

## 2023-04-11 DIAGNOSIS — J40 BRONCHITIS: ICD-10-CM

## 2023-04-11 DIAGNOSIS — J01.00 ACUTE NON-RECURRENT MAXILLARY SINUSITIS: ICD-10-CM

## 2023-04-11 DIAGNOSIS — R05.1 ACUTE COUGH: Primary | ICD-10-CM

## 2023-04-11 LAB
EXPIRATION DATE: NORMAL
INTERNAL CONTROL: NORMAL
Lab: NORMAL
SARS-COV-2 AG UPPER RESP QL IA.RAPID: NOT DETECTED

## 2023-04-11 RX ORDER — ALBUTEROL SULFATE 90 UG/1
2 AEROSOL, METERED RESPIRATORY (INHALATION) EVERY 4 HOURS PRN
Qty: 18 G | Refills: 1 | Status: SHIPPED | OUTPATIENT
Start: 2023-04-11

## 2023-04-11 RX ORDER — AMOXICILLIN AND CLAVULANATE POTASSIUM 875; 125 MG/1; MG/1
1 TABLET, FILM COATED ORAL EVERY 12 HOURS SCHEDULED
COMMUNITY
Start: 2023-04-06 | End: 2023-04-20

## 2023-04-11 RX ORDER — LEVALBUTEROL INHALATION SOLUTION 0.63 MG/3ML
0.63 SOLUTION RESPIRATORY (INHALATION) EVERY 6 HOURS PRN
Status: SHIPPED | OUTPATIENT
Start: 2023-04-11

## 2023-04-11 RX ORDER — PREDNISONE 10 MG/1
30 TABLET ORAL DAILY
Qty: 18 TABLET | Refills: 0 | Status: SHIPPED | OUTPATIENT
Start: 2023-04-11

## 2023-04-11 RX ORDER — DEXTROMETHORPHAN HYDROBROMIDE AND PROMETHAZINE HYDROCHLORIDE 15; 6.25 MG/5ML; MG/5ML
5 SYRUP ORAL 4 TIMES DAILY PRN
Qty: 125 ML | Refills: 1 | Status: SHIPPED | OUTPATIENT
Start: 2023-04-11

## 2023-04-11 RX ADMIN — LEVALBUTEROL INHALATION SOLUTION 0.63 MG: 0.63 SOLUTION RESPIRATORY (INHALATION) at 08:48

## 2023-04-11 NOTE — PROGRESS NOTES
"Chief Complaint   Patient presents with   • Cough     Congestion     • URI       History of Present Illness   Nicole Birch is a 42 y.o. female presents for acute care. Patient reports about 2 weeks of symptoms. Started w/ a sore throat. To [facility]. Neg for covid today. \"a little\" wheeze. Cough is persistent and prohibitive of sleep. Had telehealth last week and is now day 7 of amoxicillin. Continue sinus congestion, sore throat, fatigue, persistent cough. No fever.     The following portions of the patient's history were reviewed and updated as appropriate: allergies, current medications, past family history, past medical history, past social history, past surgical history and problem list.  Current Outpatient Medications on File Prior to Visit   Medication Sig Dispense Refill   • amoxicillin-clavulanate (AUGMENTIN) 875-125 MG per tablet Take 1 tablet by mouth Every 12 (Twelve) Hours.     • FLUoxetine (PROzac) 10 MG capsule Take 1 capsule by mouth Daily.     • magnesium oxide (MAGOX) 400 (241.3 Mg) MG tablet tablet Take 200 mg by mouth Daily.     • Probiotic Product (PROBIOTIC DAILY PO) Take 1 capsule by mouth Daily.     • vitamin B-12 (CYANOCOBALAMIN) 1000 MCG tablet Take 1 tablet by mouth Daily. 90 tablet 3   • Vitamin D, Cholecalciferol, 50 MCG (2000 UT) capsule Take 50 mcg by mouth Daily. 90 capsule 3   • [DISCONTINUED] clonazePAM (KlonoPIN) 0.5 MG tablet  (Patient not taking: Reported on 4/11/2023)     • [DISCONTINUED] hydrOXYzine (ATARAX) 25 MG tablet Take 1 tablet by mouth Every 8 (Eight) Hours As Needed for Anxiety. (Patient not taking: Reported on 4/11/2023) 30 tablet 2   • [DISCONTINUED] loratadine (CLARITIN) 10 MG tablet Take 1 tablet by mouth At Night As Needed. (Patient not taking: Reported on 4/11/2023)       No current facility-administered medications on file prior to visit.     Review of Systems   Constitutional: Positive for fatigue.   HENT: Positive for postnasal drip, rhinorrhea and " sore throat.    Eyes: Negative.    Respiratory: Positive for cough and wheezing.    Cardiovascular: Negative.    Gastrointestinal: Negative.    Endocrine: Negative.    Genitourinary: Negative.    Musculoskeletal: Negative.    Allergic/Immunologic: Negative.    Neurological: Negative.    Hematological: Negative.    Psychiatric/Behavioral: Negative.        Objective   Physical Exam  Vitals reviewed.   Constitutional:       Appearance: Normal appearance.   HENT:      Head: Normocephalic and atraumatic.      Right Ear: Tympanic membrane normal.      Left Ear: Tympanic membrane normal.      Nose: Nose normal.      Mouth/Throat:      Mouth: Mucous membranes are moist.   Eyes:      Extraocular Movements: Extraocular movements intact.      Pupils: Pupils are equal, round, and reactive to light.   Cardiovascular:      Rate and Rhythm: Normal rate and regular rhythm.      Pulses: Normal pulses.      Heart sounds: Normal heart sounds.   Pulmonary:      Effort: Pulmonary effort is normal.      Breath sounds: Normal breath sounds.   Abdominal:      General: Abdomen is flat.      Palpations: Abdomen is soft.   Musculoskeletal:         General: Normal range of motion.      Cervical back: Normal range of motion.   Skin:     General: Skin is warm and dry.   Neurological:      General: No focal deficit present.      Mental Status: She is alert and oriented to person, place, and time.   Psychiatric:         Mood and Affect: Mood normal.         Behavior: Behavior normal.         Thought Content: Thought content normal.         Judgment: Judgment normal.        CXR for acute bronchitis. NAD. No prior for comparison.       /76   Pulse 76   Temp 98.4 °F (36.9 °C)   Wt 63.5 kg (140 lb)   SpO2 98%   BMI 21.29 kg/m²     Assessment & Plan   Diagnoses and all orders for this visit:    Acute cough  -     POCT SARS-CoV-2 Antigen SHALINI  -     XR Chest PA & Lateral (In Office)  -     levalbuterol (XOPENEX) nebulizer solution 0.63  mg    Bronchitis    Acute non-recurrent maxillary sinusitis    Other orders  -     amoxicillin-clavulanate (AUGMENTIN) 875-125 MG per tablet; Take 1 tablet by mouth Every 12 (Twelve) Hours.  -     predniSONE (DELTASONE) 10 MG tablet; Take 3 tablets by mouth Daily. For 3 days 2 tablets daily for three days then 1 for 3 days then stop  -     albuterol sulfate  (90 Base) MCG/ACT inhaler; Inhale 2 puffs Every 4 (Four) Hours As Needed for Wheezing.  -     promethazine-dextromethorphan (PROMETHAZINE-DM) 6.25-15 MG/5ML syrup; Take 5 mL by mouth 4 (Four) Times a Day As Needed for Cough.    clementine w/ acute bronchitis. She will complete augmentin. Start prednisone in reducing doses. May take phenergan cough. She will utilize albuterol in a prn fashion. She will f/u if worsens or fails to improve.

## 2023-04-19 ENCOUNTER — TELEPHONE (OUTPATIENT)
Dept: INTERNAL MEDICINE | Facility: CLINIC | Age: 43
End: 2023-04-19

## 2023-04-19 NOTE — TELEPHONE ENCOUNTER
Caller: Nicole Birch    Relationship to patient: Self    Best call back number:     Patient is needing: PATIENT STATES SHE IS ALMOST DONE WITH THE STEROID AND SHE CONTINUES WITH A DEEP COUGH AND NOW HAVING A WHEEZE.  PATIENT WOULD LIKE A CALLBACK TO ADVISE IF SHE SHOULD BE PRESCRIBED ADDITIONAL MEDICATION(S).

## 2023-04-19 NOTE — TELEPHONE ENCOUNTER
Called patient and she wanted to wait until tomorrow to schedule an appointment. Patient is scheduled on 04/20/2023 at 11:15 with Dr. Murcia.

## 2023-04-19 NOTE — PROGRESS NOTES
Manny Murcia M.D.  Internal Medicine  [facility]  [address], [address]  [place] [address]  [phone]      Chief Complaint  Cough (Still has cough on going and wheezing. Had CXR on 04.11.23 w/ Dr. Eli //)    SUBJECTIVE    History of Present Illness    Nicole Birch is a 43 y.o. female who presents to the office today as an established patient of Dr Shayla Eli MD here today for an acute care visit.     She last saw Dr. Eli on April 11 for 2 weeks of cough and congestion.  She completed amoxicillin for this.  She saw Dr. Eli who then prescribed Augmentin, prednisone, albuterol and promethazine for bronchitis.  Patient is here for continued symptoms with wheeze.  She had a chest x-ray in office that showed clear lungs. States today symptoms have turned into more of a cough and congestion. Snot now more clear. Cough somewhat productive. No fever. No shortness of breath. She is taking mucinex DM. She has taken allergy medication. Pseudoephedrine keeps her awake. Overall symptoms are improving but still bothersome. Cough somewhat interferes with sleep. She had some wheezing yesterday. She has albuterol inhaler that helps somewhat.     Review of Systems    No Known Allergies     Outpatient Medications Marked as Taking for the 4/20/23 encounter (Office Visit) with Manny Murcia MD   Medication Sig Dispense Refill   • albuterol sulfate  (90 Base) MCG/ACT inhaler Inhale 2 puffs Every 4 (Four) Hours As Needed for Wheezing. 18 g 1   • FLUoxetine (PROzac) 10 MG capsule Take 1 capsule by mouth Daily.     • magnesium oxide (MAGOX) 400 (241.3 Mg) MG tablet tablet Take 200 mg by mouth Daily.     • predniSONE (DELTASONE) 10 MG tablet Take 3 tablets by mouth Daily. For 3 days 2 tablets daily for three days then 1 for 3 days then stop 18 tablet 0   • Probiotic Product (PROBIOTIC DAILY PO) Take 1 capsule by mouth Daily.     • promethazine-dextromethorphan (PROMETHAZINE-DM) 6.25-15  "MG/5ML syrup Take 5 mL by mouth 4 (Four) Times a Day As Needed for Cough. 125 mL 1   • vitamin B-12 (CYANOCOBALAMIN) 1000 MCG tablet Take 1 tablet by mouth Daily. 90 tablet 3   • Vitamin D, Cholecalciferol, 50 MCG (2000 UT) capsule Take 50 mcg by mouth Daily. 90 capsule 3     Current Facility-Administered Medications for the 4/20/23 encounter (Office Visit) with Manny Murcia MD   Medication Dose Route Frequency Provider Last Rate Last Admin   • levalbuterol (XOPENEX) nebulizer solution 0.63 mg  0.63 mg Nebulization Q6H PRN Shayla Eli MD   0.63 mg at 04/11/23 0848        Past Medical History:   Diagnosis Date   • Dog bite of lower leg     right ankle    • Hypothyroidism      Past Surgical History:   Procedure Laterality Date   • INCISION AND DRAINAGE LEG Right 4/28/2021    Procedure: right ankle/Achilles tendon irrigation, debridement/saucerization of calcaneus;  Surgeon: Thuan Perez Jr., MD;  Location: [facility] [place];  Service: Orthopedics;  Laterality: Right;   • WISDOM TOOTH EXTRACTION       Family History   Problem Relation Age of Onset   • Cancer Mother 67        lung    • Heart disease Father    • Malig Hyperthermia Neg Hx     reports that she has never smoked. She has never used smokeless tobacco. She reports current alcohol use. She reports that she does not use drugs.    OBJECTIVE    Vital Signs:   /68   Pulse 60   Temp 98.4 °F (36.9 °C)   Ht 172.7 cm (67.99\")   Wt 62.5 kg (137 lb 11.2 oz)   SpO2 97%   BMI 20.94 kg/m²     Physical Exam  Constitutional:       Appearance: Normal appearance. She is normal weight.   Cardiovascular:      Rate and Rhythm: Normal rate and regular rhythm.      Heart sounds: Normal heart sounds. No murmur heard.  Pulmonary:      Effort: Pulmonary effort is normal.      Breath sounds: Normal breath sounds.      Comments: cough  Abdominal:      General: Abdomen is flat. There is no distension.      Palpations: Abdomen is soft.      Tenderness: There is no abdominal " tenderness.   Skin:     General: Skin is warm and dry.   Neurological:      Mental Status: She is alert.   Psychiatric:         Mood and Affect: Mood normal.         Behavior: Behavior normal.         Thought Content: Thought content normal.            The following data was reviewed by: Manny Murcia MD on 04/20/2023:  Common labs        6/15/2022    08:58 1/6/2023    08:21   Common Labs   Glucose 76   85     BUN 8   15     Creatinine 0.77   0.83     Sodium 139   139     Potassium 3.9   4.4     Chloride 101   104     Calcium 9.3   9.4     Total Protein 6.2   6.4     Albumin 4.6   4.7     Total Bilirubin 0.5   0.5     Alkaline Phosphatase 59   62     AST (SGOT) 18   15     ALT (SGPT) 18   21     WBC 5.2      Hemoglobin 13.8      Hematocrit 41.9      Platelets 186      Total Cholesterol 223   224     Triglycerides 78   75     HDL Cholesterol 72   80     LDL Cholesterol  137   131       Data reviewed: Recent PCP note             ASSESSMENT & PLAN     Diagnoses and all orders for this visit:    1. Bronchitis (Primary)    Other orders  -     ipratropium (ATROVENT) 0.03 % nasal spray; 2 sprays into the nostril(s) as directed by provider Every 12 (Twelve) Hours.  Dispense: 30 mL; Refill: 2      bronchitis and nasopharyngitis    Discussed diagnosis and treatment of URI.  Discussed the importance of avoiding unnecessary antibiotic therapy.  Suggested symptomatic OTC remedies.  Nasal saline spray for congestion.  Continue Nasal steroids   Follow up as needed.    Health Maintenance Due   Topic Date Due   • COVID-19 Vaccine (4 - Booster for Moderna series) 01/18/2022        Follow Up  No follow-ups on file.    Patient/family had no further questions at this time and verbalized understanding of the plan discussed today.

## 2023-04-19 NOTE — TELEPHONE ENCOUNTER
Advise patient.  Dr. Eli out of office today.  Should be seen.  Offer appointment with provider with openings.  LUCAS

## 2023-04-20 ENCOUNTER — OFFICE VISIT (OUTPATIENT)
Dept: INTERNAL MEDICINE | Facility: CLINIC | Age: 43
End: 2023-04-20
Payer: COMMERCIAL

## 2023-04-20 VITALS
DIASTOLIC BLOOD PRESSURE: 68 MMHG | BODY MASS INDEX: 20.87 KG/M2 | OXYGEN SATURATION: 97 % | TEMPERATURE: 98.4 F | HEIGHT: 68 IN | WEIGHT: 137.7 LBS | SYSTOLIC BLOOD PRESSURE: 113 MMHG | HEART RATE: 60 BPM

## 2023-04-20 DIAGNOSIS — J40 BRONCHITIS: Primary | ICD-10-CM

## 2023-04-20 DIAGNOSIS — J01.00 SUBACUTE MAXILLARY SINUSITIS: ICD-10-CM

## 2023-04-20 RX ORDER — IPRATROPIUM BROMIDE 21 UG/1
2 SPRAY, METERED NASAL EVERY 12 HOURS
Qty: 30 ML | Refills: 2 | Status: SHIPPED | OUTPATIENT
Start: 2023-04-20

## 2023-05-05 ENCOUNTER — TELEPHONE (OUTPATIENT)
Dept: INTERNAL MEDICINE | Facility: CLINIC | Age: 43
End: 2023-05-05
Payer: COMMERCIAL

## 2023-08-03 DIAGNOSIS — E03.9 HYPOTHYROIDISM, UNSPECIFIED TYPE: Primary | ICD-10-CM

## 2023-08-03 DIAGNOSIS — Z00.00 HEALTHCARE MAINTENANCE: ICD-10-CM

## 2023-08-09 LAB
ALBUMIN SERPL-MCNC: 4.7 G/DL (ref 3.5–5.2)
ALBUMIN/GLOB SERPL: 2.2 G/DL
ALP SERPL-CCNC: 67 U/L (ref 39–117)
ALT SERPL-CCNC: 30 U/L (ref 1–33)
APPEARANCE UR: CLEAR
AST SERPL-CCNC: 18 U/L (ref 1–32)
BACTERIA #/AREA URNS HPF: NORMAL /HPF
BASOPHILS # BLD AUTO: 0.05 10*3/MM3 (ref 0–0.2)
BASOPHILS NFR BLD AUTO: 0.9 % (ref 0–1.5)
BILIRUB SERPL-MCNC: 0.4 MG/DL (ref 0–1.2)
BILIRUB UR QL STRIP: NEGATIVE
BUN SERPL-MCNC: 22 MG/DL (ref 6–20)
BUN/CREAT SERPL: 25 (ref 7–25)
CALCIUM SERPL-MCNC: 10.1 MG/DL (ref 8.6–10.5)
CASTS URNS QL MICRO: NORMAL /LPF
CHLORIDE SERPL-SCNC: 105 MMOL/L (ref 98–107)
CHOLEST SERPL-MCNC: 238 MG/DL (ref 0–200)
CO2 SERPL-SCNC: 28.6 MMOL/L (ref 22–29)
COLOR UR: YELLOW
CREAT SERPL-MCNC: 0.88 MG/DL (ref 0.57–1)
EGFRCR SERPLBLD CKD-EPI 2021: 83.2 ML/MIN/1.73
EOSINOPHIL # BLD AUTO: 0.15 10*3/MM3 (ref 0–0.4)
EOSINOPHIL NFR BLD AUTO: 2.7 % (ref 0.3–6.2)
EPI CELLS #/AREA URNS HPF: NORMAL /HPF (ref 0–10)
ERYTHROCYTE [DISTWIDTH] IN BLOOD BY AUTOMATED COUNT: 11.5 % (ref 12.3–15.4)
GLOBULIN SER CALC-MCNC: 2.1 GM/DL
GLUCOSE SERPL-MCNC: 87 MG/DL (ref 65–99)
GLUCOSE UR QL STRIP: NEGATIVE
HCT VFR BLD AUTO: 46.3 % (ref 34–46.6)
HDLC SERPL-MCNC: 84 MG/DL (ref 40–60)
HGB BLD-MCNC: 15.7 G/DL (ref 12–15.9)
HGB UR QL STRIP: NEGATIVE
IMM GRANULOCYTES # BLD AUTO: 0.01 10*3/MM3 (ref 0–0.05)
IMM GRANULOCYTES NFR BLD AUTO: 0.2 % (ref 0–0.5)
KETONES UR QL STRIP: NEGATIVE
LDLC SERPL CALC-MCNC: 140 MG/DL (ref 0–100)
LEUKOCYTE ESTERASE UR QL STRIP: NEGATIVE
LYMPHOCYTES # BLD AUTO: 1.7 10*3/MM3 (ref 0.7–3.1)
LYMPHOCYTES NFR BLD AUTO: 30.1 % (ref 19.6–45.3)
MCH RBC QN AUTO: 32.5 PG (ref 26.6–33)
MCHC RBC AUTO-ENTMCNC: 33.9 G/DL (ref 31.5–35.7)
MCV RBC AUTO: 95.9 FL (ref 79–97)
MICRO URNS: NORMAL
MICRO URNS: NORMAL
MONOCYTES # BLD AUTO: 0.42 10*3/MM3 (ref 0.1–0.9)
MONOCYTES NFR BLD AUTO: 7.4 % (ref 5–12)
NEUTROPHILS # BLD AUTO: 3.31 10*3/MM3 (ref 1.7–7)
NEUTROPHILS NFR BLD AUTO: 58.7 % (ref 42.7–76)
NITRITE UR QL STRIP: NEGATIVE
NRBC BLD AUTO-RTO: 0 /100 WBC (ref 0–0.2)
PH UR STRIP: 7.5 [PH] (ref 5–7.5)
PLATELET # BLD AUTO: 210 10*3/MM3 (ref 140–450)
POTASSIUM SERPL-SCNC: 4.5 MMOL/L (ref 3.5–5.2)
PROT SERPL-MCNC: 6.8 G/DL (ref 6–8.5)
PROT UR QL STRIP: NEGATIVE
RBC # BLD AUTO: 4.83 10*6/MM3 (ref 3.77–5.28)
RBC #/AREA URNS HPF: NORMAL /HPF (ref 0–2)
SODIUM SERPL-SCNC: 143 MMOL/L (ref 136–145)
SP GR UR STRIP: 1.01 (ref 1–1.03)
TRIGL SERPL-MCNC: 83 MG/DL (ref 0–150)
TSH SERPL DL<=0.005 MIU/L-ACNC: 4.37 UIU/ML (ref 0.27–4.2)
URINALYSIS REFLEX: NORMAL
UROBILINOGEN UR STRIP-MCNC: 0.2 MG/DL (ref 0.2–1)
VLDLC SERPL CALC-MCNC: 14 MG/DL (ref 5–40)
WBC # BLD AUTO: 5.64 10*3/MM3 (ref 3.4–10.8)
WBC #/AREA URNS HPF: NORMAL /HPF (ref 0–5)

## 2023-08-14 ENCOUNTER — OFFICE VISIT (OUTPATIENT)
Dept: INTERNAL MEDICINE | Facility: CLINIC | Age: 43
End: 2023-08-14
Payer: COMMERCIAL

## 2023-08-14 VITALS
SYSTOLIC BLOOD PRESSURE: 108 MMHG | WEIGHT: 140 LBS | BODY MASS INDEX: 21.22 KG/M2 | HEART RATE: 50 BPM | DIASTOLIC BLOOD PRESSURE: 68 MMHG | HEIGHT: 68 IN | OXYGEN SATURATION: 98 %

## 2023-08-14 DIAGNOSIS — F41.9 ANXIETY: ICD-10-CM

## 2023-08-14 DIAGNOSIS — E03.9 HYPOTHYROIDISM, UNSPECIFIED TYPE: ICD-10-CM

## 2023-08-14 DIAGNOSIS — Z00.00 HEALTHCARE MAINTENANCE: Primary | ICD-10-CM

## 2023-08-14 PROCEDURE — 99396 PREV VISIT EST AGE 40-64: CPT | Performed by: INTERNAL MEDICINE

## 2023-08-14 NOTE — PROGRESS NOTES
Subjective   MARTHA Birch is a 43 y.o. female who presents for a complete physical exam, to review chronic issues, and to discuss acute needs. Patient does have anxiety. She notes that her mood in general has been stable. She does have some times of increased stress and irritability. Has been taking prozac 10 mg daily. Has clonazepam available for prn usage and although may have needed this has not utilized this. Has fatigue. She notes weight challenges in last on year. TSH is 4.37. patient notes she did require thyroid supplemenation during pregnancy for her third child.         Review of Systems   Constitutional:  Positive for fatigue.   HENT: Negative.     Eyes: Negative.    Respiratory: Negative.     Cardiovascular: Negative.    Gastrointestinal: Negative.    Endocrine: Negative.    Genitourinary: Negative.    Musculoskeletal:         Left tibial stress fracture   Allergic/Immunologic: Negative.    Neurological: Negative.    Hematological: Negative.    Psychiatric/Behavioral: Negative.          Stable anxiety     The following portions of the patient's history were reviewed and updated as appropriate: allergies, current medications, past family history, past medical history, past social history, past surgical history, and problem list.     Patient Active Problem List   Diagnosis    Axillary mass    Hypothyroidism    Vitamin D deficiency disease    Abnormal finding on breast imaging    Nipple discharge    Breast density       Past Medical History:   Diagnosis Date    Dog bite of lower leg     right ankle     Hypothyroidism        Past Surgical History:   Procedure Laterality Date    INCISION AND DRAINAGE LEG Right 4/28/2021    Procedure: right ankle/Achilles tendon irrigation, debridement/saucerization of calcaneus;  Surgeon: Thuan Perez Jr., MD;  Location: [facility] [place];  Service: Orthopedics;  Laterality: Right;    WISDOM TOOTH EXTRACTION         Family History   Problem Relation Age of Onset     Cancer Mother 67        lung     Heart disease Father     Malig Hyperthermia Neg Hx        Social History     Socioeconomic History    Marital status:    Tobacco Use    Smoking status: Never    Smokeless tobacco: Never   Vaping Use    Vaping Use: Never used   Substance and Sexual Activity    Alcohol use: Yes     Comment: occasionally, prior to pregnancy    Drug use: No    Sexual activity: Defer     Partners: Male       Current Outpatient Medications on File Prior to Visit   Medication Sig Dispense Refill    FLUoxetine (PROzac) 10 MG capsule Take 1 capsule by mouth Daily.      magnesium oxide (MAGOX) 400 (241.3 Mg) MG tablet tablet Take 0.5 tablets by mouth Daily.      Probiotic Product (PROBIOTIC DAILY PO) Take 1 capsule by mouth Daily.      vitamin B-12 (CYANOCOBALAMIN) 1000 MCG tablet Take 1 tablet by mouth Daily. 90 tablet 3    Vitamin D, Cholecalciferol, 50 MCG (2000 UT) capsule Take 50 mcg by mouth Daily. 90 capsule 3    [DISCONTINUED] albuterol sulfate  (90 Base) MCG/ACT inhaler Inhale 2 puffs Every 4 (Four) Hours As Needed for Wheezing. 18 g 1    [DISCONTINUED] ipratropium (ATROVENT) 0.03 % nasal spray 2 sprays into the nostril(s) as directed by provider Every 12 (Twelve) Hours. 30 mL 2    [DISCONTINUED] predniSONE (DELTASONE) 10 MG tablet Take 3 tablets by mouth Daily. For 3 days 2 tablets daily for three days then 1 for 3 days then stop 18 tablet 0    [DISCONTINUED] promethazine-dextromethorphan (PROMETHAZINE-DM) 6.25-15 MG/5ML syrup Take 5 mL by mouth 4 (Four) Times a Day As Needed for Cough. 125 mL 1     Current Facility-Administered Medications on File Prior to Visit   Medication Dose Route Frequency Provider Last Rate Last Admin    levalbuterol (XOPENEX) nebulizer solution 0.63 mg  0.63 mg Nebulization Q6H PRN Shayla Eli MD   0.63 mg at 04/11/23 0848       No Known Allergies    Immunization History   Administered Date(s) Administered    COVID-19 (MODERNA) 1st,2nd,3rd Dose Monovalent  "03/17/2021, 04/14/2021    COVID-19 (PFIZER) Purple Cap Monovalent 11/23/2021    Fluzone >6mos 11/28/2022    Influenza Injectable Mdck Pf Quad 09/14/2020    Influenza Quad Vaccine (Inpatient) 09/15/2017    Influenza, Unspecified 09/14/2020, 11/28/2022    Tdap 07/07/2017       Objective     /68   Pulse 50   Ht 172.7 cm (68\")   Wt 63.5 kg (140 lb)   SpO2 98%   BMI 21.29 kg/mý     Physical Exam  Vitals and nursing note reviewed.   Constitutional:       Appearance: Normal appearance. She is well-developed.   HENT:      Head: Normocephalic and atraumatic.      Right Ear: Tympanic membrane and external ear normal.      Left Ear: Tympanic membrane and external ear normal.      Nose: Nose normal.      Mouth/Throat:      Mouth: Mucous membranes are moist.   Eyes:      Extraocular Movements: Extraocular movements intact.      Pupils: Pupils are equal, round, and reactive to light.   Cardiovascular:      Rate and Rhythm: Normal rate and regular rhythm.      Pulses: Normal pulses.      Heart sounds: Normal heart sounds.   Pulmonary:      Effort: Pulmonary effort is normal. No respiratory distress.      Breath sounds: Normal breath sounds.   Abdominal:      General: Abdomen is flat.      Palpations: Abdomen is soft.   Musculoskeletal:         General: Normal range of motion.      Cervical back: Normal range of motion and neck supple.   Skin:     General: Skin is warm and dry.   Neurological:      General: No focal deficit present.      Mental Status: She is alert and oriented to person, place, and time.   Psychiatric:         Mood and Affect: Mood normal.         Behavior: Behavior normal.         Thought Content: Thought content normal.         Judgment: Judgment normal.       Assessment & Plan   Diagnoses and all orders for this visit:    1. Healthcare maintenance (Primary)    2. Hypothyroidism, unspecified type  -     Cancel: T4, Free  -     Cancel: Thyroid Antibodies  -     Cancel: T3, free  -     T4, Free  -     " Thyroid Antibodies  -     T3, free  -     Vitamin D,25-Hydroxy    3. Anxiety  -     [facility] - Swab,; Future        Discussion    Patient presents today for a CPE.  Patient follows a healthy diet.   Patient follows an adequate exercise regimen. Mammogram is up to date.   Pap smears are performed by the patient's gynecologist.  Some weight gain, fatigue w elevated TSH. She will have additional testing. If needed will start synthroid replacement therapy. Will test vit d given h/o defieicny w recent stress fracture. Will continue vit d 3 supplementation. She will follow up w/ repeat levels in 2-3 months. Continue prozac at current dosing w/ prn clonazepam. May dose escalate. Gave lit on [facility].          Future Appointments   Date Time Provider Department Center   11/30/2023  9:00 AM [facility] 2 [facility]   12/8/2023  9:40 AM Kavin Horn APRN [facility]   3/27/2024  8:00 AM [facility] CT 1  [facility]

## 2023-08-16 LAB
25(OH)D3+25(OH)D2 SERPL-MCNC: 43.3 NG/ML (ref 30–100)
T3FREE SERPL-MCNC: 2.4 PG/ML (ref 2–4.4)
T4 FREE SERPL-MCNC: 1.04 NG/DL (ref 0.93–1.7)
THYROGLOB AB SERPL-ACNC: <1 IU/ML (ref 0–0.9)
THYROPEROXIDASE AB SERPL-ACNC: <9 IU/ML (ref 0–34)

## 2023-08-18 ENCOUNTER — TELEPHONE (OUTPATIENT)
Dept: INTERNAL MEDICINE | Facility: CLINIC | Age: 43
End: 2023-08-18
Payer: COMMERCIAL

## 2023-08-18 NOTE — TELEPHONE ENCOUNTER
Pt informed    ----- Message from Shayla Eli MD sent at 8/17/2023 10:14 PM EDT -----  Normal thyroid and vitamin d levels  jw

## 2023-09-18 RX ORDER — FLUOXETINE 10 MG/1
10 CAPSULE ORAL DAILY
Qty: 90 CAPSULE | Refills: 0 | Status: SHIPPED | OUTPATIENT
Start: 2023-09-18

## 2023-11-09 DIAGNOSIS — R79.89 ABNORMAL CBC: ICD-10-CM

## 2023-11-09 DIAGNOSIS — E03.9 HYPOTHYROIDISM, UNSPECIFIED TYPE: Primary | ICD-10-CM

## 2023-11-09 DIAGNOSIS — E78.5 ELEVATED LIPIDS: ICD-10-CM

## 2023-11-13 LAB
ALBUMIN SERPL-MCNC: 4.5 G/DL (ref 3.5–5.2)
ALBUMIN/GLOB SERPL: 3.5 G/DL
ALP SERPL-CCNC: 61 U/L (ref 39–117)
ALT SERPL-CCNC: 20 U/L (ref 1–33)
AST SERPL-CCNC: 17 U/L (ref 1–32)
BASOPHILS # BLD AUTO: 0.05 10*3/MM3 (ref 0–0.2)
BASOPHILS NFR BLD AUTO: 1.2 % (ref 0–1.5)
BILIRUB SERPL-MCNC: 0.5 MG/DL (ref 0–1.2)
BUN SERPL-MCNC: 14 MG/DL (ref 6–20)
BUN/CREAT SERPL: 17.7 (ref 7–25)
CALCIUM SERPL-MCNC: 9.2 MG/DL (ref 8.6–10.5)
CHLORIDE SERPL-SCNC: 104 MMOL/L (ref 98–107)
CHOLEST SERPL-MCNC: 222 MG/DL (ref 0–200)
CO2 SERPL-SCNC: 27.9 MMOL/L (ref 22–29)
CREAT SERPL-MCNC: 0.79 MG/DL (ref 0.57–1)
EGFRCR SERPLBLD CKD-EPI 2021: 94.7 ML/MIN/1.73
EOSINOPHIL # BLD AUTO: 0.24 10*3/MM3 (ref 0–0.4)
EOSINOPHIL NFR BLD AUTO: 5.6 % (ref 0.3–6.2)
ERYTHROCYTE [DISTWIDTH] IN BLOOD BY AUTOMATED COUNT: 11.6 % (ref 12.3–15.4)
GLOBULIN SER CALC-MCNC: 1.3 GM/DL
GLUCOSE SERPL-MCNC: 87 MG/DL (ref 65–99)
HCT VFR BLD AUTO: 40.4 % (ref 34–46.6)
HDLC SERPL-MCNC: 80 MG/DL (ref 40–60)
HGB BLD-MCNC: 13.9 G/DL (ref 12–15.9)
IMM GRANULOCYTES # BLD AUTO: 0.01 10*3/MM3 (ref 0–0.05)
IMM GRANULOCYTES NFR BLD AUTO: 0.2 % (ref 0–0.5)
LDLC SERPL CALC-MCNC: 133 MG/DL (ref 0–100)
LYMPHOCYTES # BLD AUTO: 1.66 10*3/MM3 (ref 0.7–3.1)
LYMPHOCYTES NFR BLD AUTO: 38.7 % (ref 19.6–45.3)
MCH RBC QN AUTO: 32.6 PG (ref 26.6–33)
MCHC RBC AUTO-ENTMCNC: 34.4 G/DL (ref 31.5–35.7)
MCV RBC AUTO: 94.6 FL (ref 79–97)
MONOCYTES # BLD AUTO: 0.33 10*3/MM3 (ref 0.1–0.9)
MONOCYTES NFR BLD AUTO: 7.7 % (ref 5–12)
NEUTROPHILS # BLD AUTO: 2 10*3/MM3 (ref 1.7–7)
NEUTROPHILS NFR BLD AUTO: 46.6 % (ref 42.7–76)
NRBC BLD AUTO-RTO: 0 /100 WBC (ref 0–0.2)
PLATELET # BLD AUTO: 172 10*3/MM3 (ref 140–450)
POTASSIUM SERPL-SCNC: 4.4 MMOL/L (ref 3.5–5.2)
PROT SERPL-MCNC: 5.8 G/DL (ref 6–8.5)
RBC # BLD AUTO: 4.27 10*6/MM3 (ref 3.77–5.28)
SODIUM SERPL-SCNC: 140 MMOL/L (ref 136–145)
TRIGL SERPL-MCNC: 53 MG/DL (ref 0–150)
TSH SERPL DL<=0.005 MIU/L-ACNC: 2.81 UIU/ML (ref 0.27–4.2)
VLDLC SERPL CALC-MCNC: 9 MG/DL (ref 5–40)
WBC # BLD AUTO: 4.29 10*3/MM3 (ref 3.4–10.8)

## 2023-11-16 NOTE — TELEPHONE ENCOUNTER
Consultation - Cardiology Office  [facility]. Yady Shah 47 y.o. female MRN: [id number]  : 1971  Unit/Bed#:  Encounter: [id number]      ASSESSMENT:  Chest Pain, occasional left upper chest unrelated to any particular level of activity. No accompanying symptoms. Patient attributes it probably to stress    Abnormal EKG  2023: NSR, possible LAE, iRBBB  2023: Normal sinus rhythm at 70/min    Essential hypertension  BP today is 140/90 mmHg with heart rate of 68/min. On 2023 it was 161/97 mmHg  On losartan 50 mg    Obesity, BMI 31.62    Dyspnea on exertion    History of COVID-19 infection          RECOMMENDATIONS:  Echocardiogram  Exercise stress test  Increase losartan to 100 mg daily  Advised on low-salt/sodium diet  Regular cardiovascular exercise and weight loss            Thank you for your consultation. If you have any question please call me at [phone]      Primary Care Physician Requesting Consult: Chery Villarreal MD      Reason for Consult / Principal Problem: Abnormal EKG, hypertension        HPI :     Yady Shah is a 47y.o. year old female who was referred by primary care doctor for for cardiac management. Patient has essential hypertension and her blood pressure is elevated on her current medications. In July EKG in the office was mildly abnormal although today it appears to be normal.  Patient also complains of off-and-on left-sided chest pain. This occurs without any level of activity or provocation. Patient attributes it to probable stress however wants to make sure that there is nothing wrong with her heart. She felt similar pain while driving to our office today. As mentioned above her EKG in the office is normal without any evidence of ischemia  I strongly advised her on low-salt diet to control her blood pressure and I am increasing her losartan to 100 mg.   We will also run some basic cardiac tests like an EKG and a stress test to NO ALBRIGHT... CHAIM SOLANO [birth year] WANTS TO TALK TO DR. MACIAS ABOUT HER MEDICATION AND RESULTS FROM CULTURES THAT WERE NOT BACK AT LAST OFFICE VISIT [phone]     make sure that her heart is okay      Review of Systems   Cardiovascular:  Positive for chest pain. All other systems reviewed and are negative. Historical Information   Past Medical History:   Diagnosis Date    Asthma had a history as a a kid/can we chat    Hard of hearing     HL (hearing loss)     Hypertension     Tympanic membrane perforation      Past Surgical History:   Procedure Laterality Date    INCONTINENCE SURGERY      bladder mesh sling    TUBAL LIGATION       Social History     Substance and Sexual Activity   Alcohol Use Not Currently    Comment: once a year maybe one wine cooler or glass of wine     Social History     Substance and Sexual Activity   Drug Use Never     Social History     Tobacco Use   Smoking Status Never   Smokeless Tobacco Never     Family History:   Family History   Problem Relation Age of Onset    Cancer Mother     COPD Father     Cancer Father     Cancer Maternal Grandmother     COPD Paternal Aunt     Cancer Paternal Aunt     Breast cancer Paternal Aunt     Cancer Paternal Grandmother         Lung Cancer       Meds/Allergies     Allergies   Allergen Reactions    Penicillins Hives       Current Outpatient Medications:     losartan (COZAAR) 100 MG tablet, Take 1 tablet (100 mg total) by mouth daily, Disp: 90 tablet, Rfl: 2    Myrbetriq 50 MG TB24, TAKE 1 TABLET BY MOUTH EVERY DAY WITH WATER, Disp: , Rfl:     methylPREDNISolone 4 MG tablet therapy pack, Use as directed on package (Patient not taking: Reported on 11/17/2023), Disp: 21 tablet, Rfl: 0    Vitals: Blood pressure 140/90, pulse 70, height 5' 5" (1.651 m), weight 86.2 kg (190 lb), last menstrual period 05/01/2022, SpO2 99 %. Body mass index is 31.62 kg/m².   Vitals:    11/17/23 0831   Weight: 86.2 kg (190 lb)     BP Readings from Last 3 Encounters:   11/17/23 140/90   11/06/23 161/97   09/13/23 138/88       Physical Exam  PHYSICAL EXAMINATION:  Neurologic:  Alert & oriented x 3, no new focal deficits, Not in any acute distress,  Constitutional:  Well developed, well nourished, non-toxic appearance   Eyes:  Pupil equal and reacting to light, conjunctiva normal, No JVP, No LNP   HENT:  Atraumatic, oropharynx moist, Neck- normal range of motion, no tenderness,  Neck supple   Respiratory:  Bilateral air entry, mostly clear to auscultation  Cardiovascular: S1-S2 regular with a I/VI systolic murmur   GI:  Soft, nondistended, normal bowel sounds, nontender, no hepatosplenomegaly appreciated. Musculoskeletal: no tenderness, no deformities. Skin:  Well hydrated, no rash   Lymphatic:  No lymphadenopathy noted   Extremities:  No edema and distal pulses are present    Diagnostic Studies Review Cardio:      EKG: Normal sinus rhythm, heart rate 70/min    Cardiac testing:   No results found for this or any previous visit. Imaging:  Chest X-Ray:   No Chest XR results available for this patient. CT-scan of the chest:     No CTA results available for this patient.   Lab Review   Lab Results   Component Value Date    WBC 4.59 07/27/2023    HGB 14.4 07/27/2023    HCT 45.1 07/27/2023    MCV 88 07/27/2023    RDW 13.2 07/27/2023     07/27/2023     BMP:  Lab Results   Component Value Date    SODIUM 144 07/27/2023    K 4.8 07/27/2023     (H) 07/27/2023    CO2 31 07/27/2023    BUN 13 07/27/2023    CREATININE 0.94 07/27/2023    GLUC 88 10/01/2021    GLUF 104 (H) 07/27/2023    CALCIUM 9.7 07/27/2023    EGFR 69 07/27/2023    MG 2.7 (H) 07/27/2023     LFT:  Lab Results   Component Value Date    AST 25 07/27/2023    ALT 35 07/27/2023    ALKPHOS 100 07/27/2023    TP 7.4 07/27/2023    ALB 3.8 07/27/2023      Lab Results   Component Value Date    WRL8MRJIKDYS 3.181 07/27/2023     No components found for: "TSH3"  Lab Results   Component Value Date    HGBA1C 5.7 (H) 07/27/2023     Lipid Profile:   Lab Results   Component Value Date    CHOLESTEROL 192 07/27/2023    HDL 55 07/27/2023    LDLCALC 109 (H) 07/27/2023    TRIG 141 07/27/2023 Lab Results   Component Value Date    CHOLESTEROL 192 07/27/2023    CHOLESTEROL 178 10/01/2021     No results found for: "CKTOTAL", "CKMB", "CKMBINDEX", "TROPONINI"  No results found for: "NTBNP"   No results found for this or any previous visit (from the past 672 hour(s)). Dr. Felix Ojeda MD, [facility]      "This note has been constructed using a voice recognition system. Therefore there may be syntax, spelling, and/or grammatical errors.  Please call if you have any questions. "

## 2023-11-27 RX ORDER — FLUOXETINE 10 MG/1
10 CAPSULE ORAL DAILY
Qty: 90 CAPSULE | Refills: 3 | Status: SHIPPED | OUTPATIENT
Start: 2023-11-27

## 2023-11-30 ENCOUNTER — APPOINTMENT (OUTPATIENT)
Dept: OTHER | Facility: HOSPITAL | Age: 43
End: 2023-11-30
Payer: COMMERCIAL

## 2023-11-30 ENCOUNTER — HOSPITAL ENCOUNTER (OUTPATIENT)
Dept: MAMMOGRAPHY | Facility: HOSPITAL | Age: 43
Discharge: HOME | End: 2023-11-30
Admitting: NURSE PRACTITIONER
Payer: COMMERCIAL

## 2023-11-30 DIAGNOSIS — Z12.31 ENCOUNTER FOR SCREENING MAMMOGRAM FOR MALIGNANT NEOPLASM OF BREAST: ICD-10-CM

## 2023-11-30 PROCEDURE — 77067 SCR MAMMO BI INCL CAD: CPT

## 2023-11-30 PROCEDURE — 77063 BREAST TOMOSYNTHESIS BI: CPT

## 2023-12-14 ENCOUNTER — TELEPHONE (OUTPATIENT)
Dept: SURGERY | Facility: CLINIC | Age: 43
End: 2023-12-14
Payer: COMMERCIAL

## 2023-12-14 NOTE — PROGRESS NOTES
[facility]     Referring Provider: Cielo AGUIRRE     Chief complaint: nipple discharge     HPI: Ms. Nicole Birch is a 41 yo woman, seen at the request of TIMOTHY Santana, for Rt nipple discharge    I personally reviewed her records and summarized her relevant breast history/imagin2019 Jim screening mammo at [facility]  Breast are heterogeneously dense.  There are stable round and punctuate calcifications with diffuse scattered distribution seen in both breast.  No suspicious masses, suspicious microcalcifications or architectural distortions are identified.  There is been no significant change from the prior exam.  Impression  Stable calcifications in both breast are benign negative.  BI-RADS 2    2020 bilateral mammography at [facility]  The breast are heterogeneously dense.  No suspicious masses, significant calcifications or other abnormalities are seen.  Impression  There is no mammographic evidence of malignancy.  BI-RADS 1    2021 bilateral screening mammogram with Rahul at [facility]  The breasts are heterogeneously dense.  There is a new high density asymmetry measuring 8 mm with indistinct margins seen in the posterior one third central region of the right breast.  This is best visualized on the Rahul MLO view slice #23.  This is only seen on the MLO view.  In the left breast, no suspicious masses, significant calcifications or other abnormalities are seen.  Impression  New asymmetry in the right breast requires additional evaluation.  Spot compression and ultrasound are recommended.  BI-RADS Category 0    2021 right breast digital diagnostic mammogram with Rahul and complete right breast ultrasound at [facility]  The breast is heterogeneously dense.  Finding 1-additional evaluation was performed for the asymmetry in the right breast, central seen on 2001.  On the present examination, asymmetry in the right breast, central does not persist.  The asymmetry noted on the recent  "screening mammogram resolves into stroma on additional views.  Ultrasound  Finding 1 there is no sonographic correlate.  There is no evidence of any solid mass or abnormal cystic elements.  Finding 2-there is an oval small anechoic septated cyst with well-defined, thin margins measuring 4 x 3 x 4 mm seen in the posterior one third subareolar region of the right breast.  No solid or suspicious sonographic abnormalities are seen.  Finding 3-there is an oval anechoic simple cyst with well-defined, thin margins measuring 5 mm seen in the 1030 o'clock region of the right breast.  Impression  Finding 1-area in the right breast is benign negative.  Finding 2-septated cyst in the posterior one third subareolar region of the right breast is probably benign.  Follow-up ultrasound of the right breast in 6 months is recommended.    Finding 3-simple cyst in the 1030 o'clock region of the right breast is benign negative.  BI-RADS 3    2/9/2022 Saw Cielo AGUIRRE  \" The patient is a 41-year-old female who presents with a chief complaint of nipple discharge.  The onset of the breast nipple discharge has been sudden has been occurring a persistent pattern for 4 days.  The course has been constant.  The breast nipple discharge is characterized as thick and sticky brown and yellow.  There are no changes to the nipple.  On the right breast only.\"    3/4/2022 right breast diagnostic mammogram with Rahul and right breast complete ultrasound at [facility]  The breast is heterogeneously dense.  Finding 1-there is no suspicious finding in region of clinical concern.  There is been no significant change from the prior exam.  Finding 2-there are a few small asymmetry seen in the several scattered location region of both breast.  Ultrasound  Finding 1-there is no sonographic correlate.  No discrete or suspicious sonographic abnormality is seen.  Finding 2-ultrasound demonstrates a few oval small simple cyst and complicated cyst seen in the several " scattered location regions of both breast.  These measure less than 5 mm.  Impression  Finding 1-areas in both breast are benign negative.  In view of the negative findings the patient's symptoms need to be followed on an ongoing clinical basis.  Finding 2 simple cysts and complicated cyst in both breast are probably benign.  Follow-up ultrasound exam in 6 months is recommended.  BI-RADS 3    9/13/2022 bilateral complete breast ultrasound at [facility]  Follow-up examination was performed for the asymmetries in both breast, several scattered locations seen on 3/4/2022.  On the present examination, there are multiple similar oval small simple cyst with debris containing cyst with well-defined, thin margins and several scattered location regions of both breast.  These measure less than 1 cm.  No internal vascularity identified by Doppler.  Findings have become better defined as cyst compared to the prior exam.  No solid or suspicious sonographic abnormality is seen.  Impression  Simple cyst and debris containing cyst in both breast are benign negative.    Screening mammogram in 3 months is recommended.  BI-RADS 2    10/10/2022 breat MRI at [facility]  FINDINGS: There is heterogeneous fibroglandular tissue. There is mild  background parenchymal enhancement.  RIGHT BREAST:    No suspicious enhancing mass or area of non-mass enhancement is  identified.  The visualized axilla is within normal limits.   LEFT BREAST:    No suspicious enhancing mass or area of non-mass enhancement is  identified.  The visualized axilla is within normal limits.   EXTRAMAMMARY FINDINGS:   There are no pathologically enlarged internal mammary chain lymph nodes  on either side.     IMPRESSION AND RECOMMENDATION:  No MRI evidence of malignancy in either breast. Further management of  the patient's nipple discharge should be based on clinical assessment.  Otherwise, routine screening is recommended.   BI-RADS Category 1: Negative    [facility]/2/20[facility]  The breasts are heterogeneously dense.  There are stable areas of asymmetric breast tissue seen in both breast.  Scattered benign-appearing calcifications are noted.  No suspicious masses suspicious microcalcifications or architectural distortions are identified.  There is been no significant change from prior exam.  Impression  Stable areas of asymmetric breast tissue in both breast are benign negative.  Screening mammogram in 1 year is recommended.  BI-RADS Category 2    She has a personal history of hypothyroidism    She denies any family history of breast or ovarian cancer.     Today she presents with concern regarding nipple discharge. Noticed right nipple discharge 6 months ago while doing a breast exam. Since then she can express a discharge with squeezing nipple.  White discharge. Single duct.    She denies any breast lumps, pain or skin changes.  She denies any prior history of breast biopsies.    She was by herself in clinic today.     1/3/2023   Patient is returning to the office today for routine follow-up. She has no new breast complaints today.  She has stopped expressing right nipple and the issue has completely resolved.  Not seeing any discharge on bra or bedding.   Imaging in order and all wnl    12/15/2023 Interval History  Patient presenting to the office today for routine follow-up.  She had a bilateral screening mammogram on 11/30/2023 that resulted as BI-RADS 1.  She is no longer having any nipple discharge.  No new breast complaints or concerns today.      Review of Systems   Constitutional:  Positive for diaphoresis and unexpected weight change.        Weight gain   HENT:   Positive for tinnitus.    Psychiatric/Behavioral:  The patient is nervous/anxious.    All other systems reviewed and are negative.      Medications:    Current Outpatient Medications:     FLUoxetine (PROzac) 10 MG capsule, TAKE 1 CAPSULE BY MOUTH DAILY, Disp: 90 capsule, Rfl: 3    magnesium oxide (MAGOX) 400  (241.3 Mg) MG tablet tablet, Take 0.5 tablets by mouth Daily., Disp: , Rfl:     Probiotic Product (PROBIOTIC DAILY PO), Take 1 capsule by mouth Daily., Disp: , Rfl:     vitamin B-12 (CYANOCOBALAMIN) 1000 MCG tablet, Take 1 tablet by mouth Daily., Disp: 90 tablet, Rfl: 3    Vitamin D, Cholecalciferol, 50 MCG (2000 UT) capsule, Take 50 mcg by mouth Daily., Disp: 90 capsule, Rfl: 3    Current Facility-Administered Medications:     levalbuterol (XOPENEX) nebulizer solution 0.63 mg, 0.63 mg, Nebulization, Q6H PRN, Shayla Eli MD, 0.63 mg at 23 0848    Allergies:  No Known Allergies    Medical history:  Past Medical History:   Diagnosis Date    Dog bite of lower leg     right ankle     Hypothyroidism        Surgical History:  Past Surgical History:   Procedure Laterality Date    INCISION AND DRAINAGE LEG Right 2021    Procedure: right ankle/Achilles tendon irrigation, debridement/saucerization of calcaneus;  Surgeon: Thuan Perez Jr., MD;  Location: [facility];  Service: Orthopedics;  Laterality: Right;    WISDOM TOOTH EXTRACTION         Family History:  Family History   Problem Relation Age of Onset    Cancer Mother 67        lung     Heart disease Father     Malig Hyperthermia Neg Hx        Social History:   Social History     Socioeconomic History    Marital status:    Tobacco Use    Smoking status: Never    Smokeless tobacco: Never   Vaping Use    Vaping Use: Never used   Substance and Sexual Activity    Alcohol use: Yes     Comment: occasionally, prior to pregnancy    Drug use: No    Sexual activity: Defer     Partners: Male     Patient drinks 2 servings of caffeine per day.       GYNECOLOGIC HISTORY:   G: 3. P: 3. AB: 0.  Last menstrual period: 09/10/2022  Age at menarche: 11  Age at first childbirth: 38  Lactation/How lon year  Age at menopause: n/a  Total years of oral contraceptive use: never  Total years of hormone replacement therapy: never      Physical Exam  There were no vitals  filed for this visit.    ECOG 0 - Asymptomatic  General: NAD, well appearing  Psych: a&o x 3, normal mood and affect  Eyes: EOMI, no scleral icterus  ENMT: neck supple without masses or thyromegaly, mucus membranes moist  Resp: normal effort, CTAB  CV: RRR, no murmurs, no edema   GI: soft, NT, ND  MSK: normal gait, normal ROM in bilateral shoulders  Lymph nodes:  no cervical, supraclavicular or axillary lymphadenopathy  Breast: symmetric, small  Right: No visible abnormalities on inspection while seated, with arms raised or hands on hips. No masses, skin changes, or nipple abnormalities.  Left: No visible abnormalities on inspection while seated, with arms raised or hands on hips. No masses, skin changes, or nipple abnormalities.    Imagin2023 bilateral screening mammogram at [facility]  FINDINGS: Bilateral digital CC and MLO mammographic and digital  Tomosynthesis images were obtained. Comparison is made to prior studies  dated 2022 and 2021. The parenchyma of both breasts  demonstrates a heterogeneously dense pattern which lessens the  sensitivity. I see no new or dominant masses, areas of architectural  distortion or skin thickening. There is no evidence for axillary  lymphadenopathy or nipple retraction.  IMPRESSION:  1. There is no evidence for malignancy or significant change in either  breast. Routine followup mammography is recommended.  Given the density  of the patient's breast tissue, correlation with screening bilateral  breast sonography and/or bilateral breast MRI may provide additional  benefit and should be considered.  BI-RADS category 1: Negative.     Assessment:    1)  Abnormal imaging- surveillance ended 22  2. Nipple discharge- resolved   3. Breast density    Discussion:  1. I explained the concept of a BI-RADS 3 designation and the process of imaging surveillance. We discussed that a BI-RADS 3 designation describes an imaging finding that is 98-99% likely to represent a  benign process. We discussed that the most common management of a BI-RADS 3 finding is initial 6 month imaging surveillance and that the entire period of imaging surveillance can often take 2 years. She is no longer in surveillance at this time.  2. We discussed types of nipple discharge. We discussed that physiologic discharge is usually bilateral, nonspontaneous, multi-ductal, and milky, but can be yellow, green or brown. That this can sometimes be associated with elevated prolactin levels and that elevated prolactin levels can have multiple causes (pregnancy, pituitary, thyroid, medications, etc).    We discussed that pathologic nipple discharge usually is unilateral, spontaneous, persistent, comes from a single duct, is bloody, clear, or serosanguinous, or is associated with a palpable or radiological evident breast mass.   We will get a breast MRI to look for a mammographically or sonographically occult lesion. If MRI finds a lesion, she may require a second-look US or MR-biopsy. If MRI is negative, then will proceed with ductogram.   3. Breast density describes how the breasts look on a mammogram.  Breast and connective tissue are denser than fat and this difference shows up on the mammogram.  Young women often have dense breasts.  As we age, breast become less dense.  Dense breast can make it harder to find breast cancer on the mammogram.  Women with high breast density have an increased risk of breast cancer.  Educational materials regarding breast density were given and reviewed.  Tomosynthesis imaging will be completed with next screening study.      Plan:  1. Screening mammo in Nov 2024, which can be ordered by her PCP or her OB/GYN.  I am not giving her a follow-up appointment in the office but if she experiences any new breast concerns or if the nipple discharge return I would be happy to see her back  2. Cont monthly self breast exams      TIMOTHY Hernandez    I have spent 20 min in face to face time  with the patient and in chart review.    CC:  TIMOTHY Santana Jill R, MD    EMR Dragon/transcription disclaimer:  Dictated using Dragon dictation

## 2023-12-15 ENCOUNTER — OFFICE VISIT (OUTPATIENT)
Dept: SURGERY | Facility: CLINIC | Age: 43
End: 2023-12-15
Payer: COMMERCIAL

## 2023-12-15 VITALS
OXYGEN SATURATION: 100 % | SYSTOLIC BLOOD PRESSURE: 104 MMHG | DIASTOLIC BLOOD PRESSURE: 66 MMHG | BODY MASS INDEX: 21.22 KG/M2 | HEART RATE: 65 BPM | WEIGHT: 140 LBS | HEIGHT: 68 IN

## 2023-12-15 DIAGNOSIS — R92.30 BREAST DENSITY: Primary | ICD-10-CM

## 2023-12-15 PROCEDURE — 99213 OFFICE O/P EST LOW 20 MIN: CPT | Performed by: NURSE PRACTITIONER

## 2024-02-06 ENCOUNTER — TELEPHONE (OUTPATIENT)
Dept: SURGERY | Facility: CLINIC | Age: 44
End: 2024-02-06
Payer: COMMERCIAL

## 2024-02-06 NOTE — TELEPHONE ENCOUNTER
----- Message from Corin Greenberg MA sent at 2/6/2024  9:57 AM EST -----  Regarding: FW: Appointment Request  Contact: [phone]    ----- Message -----  From: Nicole Birch  Sent: 2/6/2024   9:51 AM EST  To: [facility]  Subject: Appointment Request                              Appointment Request From: Nicole Birch    With Provider: Kavin Horn [[facility] BREAST SURGERY]    Preferred Date Range: 2/7/2024 – 2/9/2024    Preferred Times: Any Time    Reason for visit: Follow-up    Comments:  Have noticed a small nodule in left breast below the nipple area. Area is sore to the touch.

## 2024-02-08 NOTE — PROGRESS NOTES
[facility]     Referring Provider: Cielo AGUIRRE     Chief complaint: nipple discharge     HPI: Ms. Nicole Birch is a 41 yo woman, seen at the request of TIMOTHY Santana, for Rt nipple discharge    I personally reviewed her records and summarized her relevant breast history/imagin2019 Jim screening mammo at [facility]  Breast are heterogeneously dense.  There are stable round and punctuate calcifications with diffuse scattered distribution seen in both breast.  No suspicious masses, suspicious microcalcifications or architectural distortions are identified.  There is been no significant change from the prior exam.  Impression  Stable calcifications in both breast are benign negative.  BI-RADS 2    2020 bilateral mammography at [facility]  The breast are heterogeneously dense.  No suspicious masses, significant calcifications or other abnormalities are seen.  Impression  There is no mammographic evidence of malignancy.  BI-RADS 1    2021 bilateral screening mammogram with Rahul at [facility]  The breasts are heterogeneously dense.  There is a new high density asymmetry measuring 8 mm with indistinct margins seen in the posterior one third central region of the right breast.  This is best visualized on the Rahul MLO view slice #23.  This is only seen on the MLO view.  In the left breast, no suspicious masses, significant calcifications or other abnormalities are seen.  Impression  New asymmetry in the right breast requires additional evaluation.  Spot compression and ultrasound are recommended.  BI-RADS Category 0    2021 right breast digital diagnostic mammogram with Rahul and complete right breast ultrasound at [facility]  The breast is heterogeneously dense.  Finding 1-additional evaluation was performed for the asymmetry in the right breast, central seen on 2001.  On the present examination, asymmetry in the right breast, central does not persist.  The asymmetry noted on the recent  "screening mammogram resolves into stroma on additional views.  Ultrasound  Finding 1 there is no sonographic correlate.  There is no evidence of any solid mass or abnormal cystic elements.  Finding 2-there is an oval small anechoic septated cyst with well-defined, thin margins measuring 4 x 3 x 4 mm seen in the posterior one third subareolar region of the right breast.  No solid or suspicious sonographic abnormalities are seen.  Finding 3-there is an oval anechoic simple cyst with well-defined, thin margins measuring 5 mm seen in the 1030 o'clock region of the right breast.  Impression  Finding 1-area in the right breast is benign negative.  Finding 2-septated cyst in the posterior one third subareolar region of the right breast is probably benign.  Follow-up ultrasound of the right breast in 6 months is recommended.    Finding 3-simple cyst in the 1030 o'clock region of the right breast is benign negative.  BI-RADS 3    2/9/2022 Saw Cielo AGUIRRE  \" The patient is a 41-year-old female who presents with a chief complaint of nipple discharge.  The onset of the breast nipple discharge has been sudden has been occurring a persistent pattern for 4 days.  The course has been constant.  The breast nipple discharge is characterized as thick and sticky brown and yellow.  There are no changes to the nipple.  On the right breast only.\"    3/4/2022 right breast diagnostic mammogram with Rahul and right breast complete ultrasound at [facility]  The breast is heterogeneously dense.  Finding 1-there is no suspicious finding in region of clinical concern.  There is been no significant change from the prior exam.  Finding 2-there are a few small asymmetry seen in the several scattered location region of both breast.  Ultrasound  Finding 1-there is no sonographic correlate.  No discrete or suspicious sonographic abnormality is seen.  Finding 2-ultrasound demonstrates a few oval small simple cyst and complicated cyst seen in the several " scattered location regions of both breast.  These measure less than 5 mm.  Impression  Finding 1-areas in both breast are benign negative.  In view of the negative findings the patient's symptoms need to be followed on an ongoing clinical basis.  Finding 2 simple cysts and complicated cyst in both breast are probably benign.  Follow-up ultrasound exam in 6 months is recommended.  BI-RADS 3    9/13/2022 bilateral complete breast ultrasound at [facility]  Follow-up examination was performed for the asymmetries in both breast, several scattered locations seen on 3/4/2022.  On the present examination, there are multiple similar oval small simple cyst with debris containing cyst with well-defined, thin margins and several scattered location regions of both breast.  These measure less than 1 cm.  No internal vascularity identified by Doppler.  Findings have become better defined as cyst compared to the prior exam.  No solid or suspicious sonographic abnormality is seen.  Impression  Simple cyst and debris containing cyst in both breast are benign negative.    Screening mammogram in 3 months is recommended.  BI-RADS 2    10/10/2022 breat MRI at [facility]  FINDINGS: There is heterogeneous fibroglandular tissue. There is mild  background parenchymal enhancement.  RIGHT BREAST:    No suspicious enhancing mass or area of non-mass enhancement is  identified.  The visualized axilla is within normal limits.   LEFT BREAST:    No suspicious enhancing mass or area of non-mass enhancement is  identified.  The visualized axilla is within normal limits.   EXTRAMAMMARY FINDINGS:   There are no pathologically enlarged internal mammary chain lymph nodes  on either side.     IMPRESSION AND RECOMMENDATION:  No MRI evidence of malignancy in either breast. Further management of  the patient's nipple discharge should be based on clinical assessment.  Otherwise, routine screening is recommended.   BI-RADS Category 1: Negative    11/2/2022 [facility]  The breasts are heterogeneously dense.  There are stable areas of asymmetric breast tissue seen in both breast.  Scattered benign-appearing calcifications are noted.  No suspicious masses suspicious microcalcifications or architectural distortions are identified.  There is been no significant change from prior exam.  Impression  Stable areas of asymmetric breast tissue in both breast are benign negative.  Screening mammogram in 1 year is recommended.  BI-RADS Category 2    She has a personal history of hypothyroidism    She denies any family history of breast or ovarian cancer.     Today she presents with concern regarding nipple discharge. Noticed right nipple discharge 6 months ago while doing a breast exam. Since then she can express a discharge with squeezing nipple.  White discharge. Single duct.    She denies any breast lumps, pain or skin changes.  She denies any prior history of breast biopsies.    She was by herself in clinic today.     1/3/2023   Patient is returning to the office today for routine follow-up. She has no new breast complaints today.  She has stopped expressing right nipple and the issue has completely resolved.  Not seeing any discharge on bra or bedding.   Imaging in order and all wnl    12/15/2023   Patient presenting to the office today for routine follow-up.  She had a bilateral screening mammogram on 11/30/2023 that resulted as BI-RADS 1.  She is no longer having any nipple discharge.  No new breast complaints or concerns today.    2/8/2024 Interval History  Patient presenting to the office today with concerns regarding a new nodule in her left breast just below the nipple area that she found 1 week ago. Pea size, movable, sometimes can't find it.  No change since discovering it.  The area is sore to touch.    Review of Systems   Constitutional:  Positive for diaphoresis and unexpected weight change.        Weight gain   HENT:   Positive for tinnitus.     Psychiatric/Behavioral:  The patient is nervous/anxious.    All other systems reviewed and are negative.      Medications:    Current Outpatient Medications:     FLUoxetine (PROzac) 10 MG capsule, TAKE 1 CAPSULE BY MOUTH DAILY, Disp: 90 capsule, Rfl: 3    magnesium oxide (MAGOX) 400 (241.3 Mg) MG tablet tablet, Take 0.5 tablets by mouth Daily., Disp: , Rfl:     Probiotic Product (PROBIOTIC DAILY PO), Take 1 capsule by mouth Daily., Disp: , Rfl:     vitamin B-12 (CYANOCOBALAMIN) 1000 MCG tablet, Take 1 tablet by mouth Daily., Disp: 90 tablet, Rfl: 3    Vitamin D, Cholecalciferol, 50 MCG (2000 UT) capsule, Take 50 mcg by mouth Daily., Disp: 90 capsule, Rfl: 3    Current Facility-Administered Medications:     levalbuterol (XOPENEX) nebulizer solution 0.63 mg, 0.63 mg, Nebulization, Q6H PRN, Shayla Eli MD, 0.63 mg at 04/11/23 0848    Allergies:  No Known Allergies    Medical history:  Past Medical History:   Diagnosis Date    Dog bite of lower leg     right ankle     Hypothyroidism        Surgical History:  Past Surgical History:   Procedure Laterality Date    INCISION AND DRAINAGE LEG Right 4/28/2021    Procedure: right ankle/Achilles tendon irrigation, debridement/saucerization of calcaneus;  Surgeon: Thuan Perez Jr., MD;  Location: [facility] OR [facility];  Service: Orthopedics;  Laterality: Right;    WISDOM TOOTH EXTRACTION         Family History:  Family History   Problem Relation Age of Onset    Cancer Mother 67        lung     Heart disease Father     Malig Hyperthermia Neg Hx        Social History:   Social History     Socioeconomic History    Marital status:    Tobacco Use    Smoking status: Never     Passive exposure: Never    Smokeless tobacco: Never   Vaping Use    Vaping Use: Never used   Substance and Sexual Activity    Alcohol use: Yes     Comment: occasionally, prior to pregnancy    Drug use: No    Sexual activity: Defer     Partners: Male     Patient drinks 2 servings of caffeine per day.        GYNECOLOGIC HISTORY:   G: 3. P: 3. AB: 0.  Last menstrual period: 09/10/2022  Age at menarche: 11  Age at first childbirth: 38  Lactation/How lon year  Age at menopause: n/a  Total years of oral contraceptive use: never  Total years of hormone replacement therapy: never      Physical Exam  Vitals:    24 0959   BP: 102/64   Pulse: 62   SpO2: 98%       ECOG 0 - Asymptomatic  General: NAD, well appearing  Psych: a&o x 3, normal mood and affect  Eyes: EOMI, no scleral icterus  ENMT: neck supple without masses or thyromegaly, mucus membranes moist  Resp: normal effort, CTAB  CV: RRR, no murmurs, no edema   GI: soft, NT, ND  MSK: normal gait, normal ROM in bilateral shoulders  Lymph nodes:  no cervical, supraclavicular or axillary lymphadenopathy  Breast: symmetric, small  Right: No visible abnormalities on inspection while seated, with arms raised or hands on hips. No masses, skin changes, or nipple abnormalities.  Left: No visible abnormalities on inspection while seated, with arms raised or hands on hips. No masses, skin changes, or nipple abnormalities. 6:30 3 cmfn pea size nodule     Imagin2023 bilateral screening mammogram at [facility]  FINDINGS: Bilateral digital CC and MLO mammographic and digital  Tomosynthesis images were obtained. Comparison is made to prior studies  dated 2022 and 2021. The parenchyma of both breasts  demonstrates a heterogeneously dense pattern which lessens the  sensitivity. I see no new or dominant masses, areas of architectural  distortion or skin thickening. There is no evidence for axillary  lymphadenopathy or nipple retraction.  IMPRESSION:  1. There is no evidence for malignancy or significant change in either  breast. Routine followup mammography is recommended.  Given the density  of the patient's breast tissue, correlation with screening bilateral  breast sonography and/or bilateral breast MRI may provide additional  benefit and should be  considered.  BI-RADS category 1: Negative.     Assessment:    1)  Abnormal imaging- surveillance ended 9/13/22  2. Nipple discharge- resolved   3. Breast density    Discussion:  1. I explained the concept of a BI-RADS 3 designation and the process of imaging surveillance. We discussed that a BI-RADS 3 designation describes an imaging finding that is 98-99% likely to represent a benign process. We discussed that the most common management of a BI-RADS 3 finding is initial 6 month imaging surveillance and that the entire period of imaging surveillance can often take 2 years. She is no longer in surveillance at this time.  2. We discussed types of nipple discharge. We discussed that physiologic discharge is usually bilateral, nonspontaneous, multi-ductal, and milky, but can be yellow, green or brown. That this can sometimes be associated with elevated prolactin levels and that elevated prolactin levels can have multiple causes (pregnancy, pituitary, thyroid, medications, etc).    We discussed that pathologic nipple discharge usually is unilateral, spontaneous, persistent, comes from a single duct, is bloody, clear, or serosanguinous, or is associated with a palpable or radiological evident breast mass.   We will get a breast MRI to look for a mammographically or sonographically occult lesion. If MRI finds a lesion, she may require a second-look US or MR-biopsy. If MRI is negative, then will proceed with ductogram.   3. Breast density describes how the breasts look on a mammogram.  Breast and connective tissue are denser than fat and this difference shows up on the mammogram.  Young women often have dense breasts.  As we age, breast become less dense.  Dense breast can make it harder to find breast cancer on the mammogram.  Women with high breast density have an increased risk of breast cancer.  Educational materials regarding breast density were given and reviewed.  Tomosynthesis imaging will be completed with next  screening study.      Plan:  1. Left dx mammo and left limited us in the near future and call with results  2. Due for screening mammo in Nov  3. Monthly self exams     TIMOTHY Hernandez have spent 20 min in face to face time with the patient and in chart review.    CC:  TIMOTHY Santana Jill R, MD    EMR Dragon/transcription disclaimer:  Dictated using Dragon dictation

## 2024-02-09 ENCOUNTER — OFFICE VISIT (OUTPATIENT)
Dept: SURGERY | Facility: CLINIC | Age: 44
End: 2024-02-09
Payer: COMMERCIAL

## 2024-02-09 VITALS
BODY MASS INDEX: 21.22 KG/M2 | OXYGEN SATURATION: 98 % | SYSTOLIC BLOOD PRESSURE: 102 MMHG | DIASTOLIC BLOOD PRESSURE: 64 MMHG | HEART RATE: 62 BPM | WEIGHT: 140 LBS | HEIGHT: 68 IN

## 2024-02-09 DIAGNOSIS — N63.0 BREAST NODULE: Primary | ICD-10-CM

## 2024-02-09 PROCEDURE — 99213 OFFICE O/P EST LOW 20 MIN: CPT | Performed by: NURSE PRACTITIONER

## 2024-02-14 ENCOUNTER — APPOINTMENT (OUTPATIENT)
Dept: WOMENS IMAGING | Facility: HOSPITAL | Age: 44
End: 2024-02-14
Payer: COMMERCIAL

## 2024-02-14 PROCEDURE — 77065 DX MAMMO INCL CAD UNI: CPT | Performed by: RADIOLOGY

## 2024-02-14 PROCEDURE — 76642 ULTRASOUND BREAST LIMITED: CPT | Performed by: RADIOLOGY

## 2024-02-14 PROCEDURE — 77061 BREAST TOMOSYNTHESIS UNI: CPT | Performed by: RADIOLOGY

## 2024-02-14 PROCEDURE — G0279 TOMOSYNTHESIS, MAMMO: HCPCS | Performed by: RADIOLOGY

## 2024-02-15 ENCOUNTER — PREP FOR SURGERY (OUTPATIENT)
Dept: OTHER | Facility: HOSPITAL | Age: 44
End: 2024-02-15
Payer: COMMERCIAL

## 2024-02-15 ENCOUNTER — TELEPHONE (OUTPATIENT)
Dept: SURGERY | Facility: CLINIC | Age: 44
End: 2024-02-15
Payer: COMMERCIAL

## 2024-02-15 DIAGNOSIS — R92.8 ABNORMAL FINDING ON BREAST IMAGING: Primary | ICD-10-CM

## 2024-02-26 ENCOUNTER — LAB REQUISITION (OUTPATIENT)
Dept: LAB | Facility: HOSPITAL | Age: 44
End: 2024-02-26
Payer: COMMERCIAL

## 2024-02-26 ENCOUNTER — APPOINTMENT (OUTPATIENT)
Dept: WOMENS IMAGING | Facility: HOSPITAL | Age: 44
End: 2024-02-26
Payer: COMMERCIAL

## 2024-02-26 DIAGNOSIS — N63.0 UNSPECIFIED LUMP IN UNSPECIFIED BREAST: ICD-10-CM

## 2024-02-26 PROCEDURE — 88305 TISSUE EXAM BY PATHOLOGIST: CPT | Performed by: NURSE PRACTITIONER

## 2024-02-26 PROCEDURE — 19083 BX BREAST 1ST LESION US IMAG: CPT | Performed by: RADIOLOGY

## 2024-02-26 PROCEDURE — A4648 IMPLANTABLE TISSUE MARKER: HCPCS | Performed by: RADIOLOGY

## 2024-02-28 LAB
DX PRELIMINARY: NORMAL
LAB AP CASE REPORT: NORMAL
PATH REPORT.FINAL DX SPEC: NORMAL
PATH REPORT.GROSS SPEC: NORMAL

## 2024-02-29 ENCOUNTER — TELEPHONE (OUTPATIENT)
Dept: SURGERY | Facility: CLINIC | Age: 44
End: 2024-02-29
Payer: COMMERCIAL

## 2024-02-29 DIAGNOSIS — R92.8 ABNORMAL FINDING ON BREAST IMAGING: Primary | ICD-10-CM

## 2024-02-29 NOTE — TELEPHONE ENCOUNTER
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  We do need to do a follow-up ultrasound in 6 months.  Please place an order for a left limited breast ultrasound at [facility] with a follow-up visit with me after

## 2024-02-29 NOTE — TELEPHONE ENCOUNTER
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

## 2024-03-07 ENCOUNTER — TELEPHONE (OUTPATIENT)
Dept: SURGERY | Facility: CLINIC | Age: 44
End: 2024-03-07
Payer: COMMERCIAL

## 2024-03-27 ENCOUNTER — HOSPITAL ENCOUNTER (OUTPATIENT)
Dept: CT IMAGING | Facility: HOSPITAL | Age: 44
Discharge: HOME | End: 2024-03-27
Admitting: INTERNAL MEDICINE
Payer: COMMERCIAL

## 2024-03-27 DIAGNOSIS — R91.1 PULMONARY NODULE: ICD-10-CM

## 2024-03-27 PROCEDURE — 71250 CT THORAX DX C-: CPT

## 2024-08-30 ENCOUNTER — APPOINTMENT (OUTPATIENT)
Dept: WOMENS IMAGING | Facility: HOSPITAL | Age: 44
End: 2024-08-30
Payer: COMMERCIAL

## 2024-08-30 PROCEDURE — 76642 ULTRASOUND BREAST LIMITED: CPT | Performed by: RADIOLOGY

## 2024-09-05 NOTE — PROGRESS NOTES
[facility]     Referring Provider: Cielo AGUIRRE     Chief complaint: nipple discharge     HPI: Ms. Nicole Birch is a 43 yo woman, seen at the request of TIMOTHY Santana, for Rt nipple discharge    I personally reviewed her records and summarized her relevant breast history/imagin2019 Jim screening mammo at [facility]  Breast are heterogeneously dense.  There are stable round and punctuate calcifications with diffuse scattered distribution seen in both breast.  No suspicious masses, suspicious microcalcifications or architectural distortions are identified.  There is been no significant change from the prior exam.  Impression  Stable calcifications in both breast are benign negative.  BI-RADS 2    2020 bilateral mammography at [facility]  The breast are heterogeneously dense.  No suspicious masses, significant calcifications or other abnormalities are seen.  Impression  There is no mammographic evidence of malignancy.  BI-RADS 1    2021 bilateral screening mammogram with Rahul at [facility]  The breasts are heterogeneously dense.  There is a new high density asymmetry measuring 8 mm with indistinct margins seen in the posterior one third central region of the right breast.  This is best visualized on the Rahul MLO view slice #23.  This is only seen on the MLO view.  In the left breast, no suspicious masses, significant calcifications or other abnormalities are seen.  Impression  New asymmetry in the right breast requires additional evaluation.  Spot compression and ultrasound are recommended.  BI-RADS Category 0    2021 right breast digital diagnostic mammogram with Rahul and complete right breast ultrasound at [facility]  The breast is heterogeneously dense.  Finding 1-additional evaluation was performed for the asymmetry in the right breast, central seen on 2001.  On the present examination, asymmetry in the right breast, central does not persist.  The asymmetry noted on the recent  "screening mammogram resolves into stroma on additional views.  Ultrasound  Finding 1 there is no sonographic correlate.  There is no evidence of any solid mass or abnormal cystic elements.  Finding 2-there is an oval small anechoic septated cyst with well-defined, thin margins measuring 4 x 3 x 4 mm seen in the posterior one third subareolar region of the right breast.  No solid or suspicious sonographic abnormalities are seen.  Finding 3-there is an oval anechoic simple cyst with well-defined, thin margins measuring 5 mm seen in the 1030 o'clock region of the right breast.  Impression  Finding 1-area in the right breast is benign negative.  Finding 2-septated cyst in the posterior one third subareolar region of the right breast is probably benign.  Follow-up ultrasound of the right breast in 6 months is recommended.    Finding 3-simple cyst in the 1030 o'clock region of the right breast is benign negative.  BI-RADS 3    2/9/2022 Saw Cielo AGUIRRE  \" The patient is a 41-year-old female who presents with a chief complaint of nipple discharge.  The onset of the breast nipple discharge has been sudden has been occurring a persistent pattern for 4 days.  The course has been constant.  The breast nipple discharge is characterized as thick and sticky brown and yellow.  There are no changes to the nipple.  On the right breast only.\"    3/4/2022 right breast diagnostic mammogram with Rahul and right breast complete ultrasound at [facility]  The breast is heterogeneously dense.  Finding 1-there is no suspicious finding in region of clinical concern.  There is been no significant change from the prior exam.  Finding 2-there are a few small asymmetry seen in the several scattered location region of both breast.  Ultrasound  Finding 1-there is no sonographic correlate.  No discrete or suspicious sonographic abnormality is seen.  Finding 2-ultrasound demonstrates a few oval small simple cyst and complicated cyst seen in the several " scattered location regions of both breast.  These measure less than 5 mm.  Impression  Finding 1-areas in both breast are benign negative.  In view of the negative findings the patient's symptoms need to be followed on an ongoing clinical basis.  Finding 2 simple cysts and complicated cyst in both breast are probably benign.  Follow-up ultrasound exam in 6 months is recommended.  BI-RADS 3    9/13/2022 bilateral complete breast ultrasound at [facility]  Follow-up examination was performed for the asymmetries in both breast, several scattered locations seen on 3/4/2022.  On the present examination, there are multiple similar oval small simple cyst with debris containing cyst with well-defined, thin margins and several scattered location regions of both breast.  These measure less than 1 cm.  No internal vascularity identified by Doppler.  Findings have become better defined as cyst compared to the prior exam.  No solid or suspicious sonographic abnormality is seen.  Impression  Simple cyst and debris containing cyst in both breast are benign negative.    Screening mammogram in 3 months is recommended.  BI-RADS 2    10/10/2022 breat MRI at [facility]  FINDINGS: There is heterogeneous fibroglandular tissue. There is mild  background parenchymal enhancement.  RIGHT BREAST:    No suspicious enhancing mass or area of non-mass enhancement is  identified.  The visualized axilla is within normal limits.   LEFT BREAST:    No suspicious enhancing mass or area of non-mass enhancement is  identified.  The visualized axilla is within normal limits.   EXTRAMAMMARY FINDINGS:   There are no pathologically enlarged internal mammary chain lymph nodes  on either side.     IMPRESSION AND RECOMMENDATION:  No MRI evidence of malignancy in either breast. Further management of  the patient's nipple discharge should be based on clinical assessment.  Otherwise, routine screening is recommended.   BI-RADS Category 1: Negative    11/2/2022 [facility]  The breasts are heterogeneously dense.  There are stable areas of asymmetric breast tissue seen in both breast.  Scattered benign-appearing calcifications are noted.  No suspicious masses suspicious microcalcifications or architectural distortions are identified.  There is been no significant change from prior exam.  Impression  Stable areas of asymmetric breast tissue in both breast are benign negative.  Screening mammogram in 1 year is recommended.  BI-RADS Category 2    She has a personal history of hypothyroidism    She denies any family history of breast or ovarian cancer.     Today she presents with concern regarding nipple discharge. Noticed right nipple discharge 6 months ago while doing a breast exam. Since then she can express a discharge with squeezing nipple.  White discharge. Single duct.    She denies any breast lumps, pain or skin changes.  She denies any prior history of breast biopsies.    She was by herself in clinic today.     1/3/2023   Patient is returning to the office today for routine follow-up. She has no new breast complaints today.  She has stopped expressing right nipple and the issue has completely resolved.  Not seeing any discharge on bra or bedding.   Imaging in order and all wnl    12/15/2023   Patient presenting to the office today for routine follow-up.  She had a bilateral screening mammogram on 11/30/2023 that resulted as BI-RADS 1.  She is no longer having any nipple discharge.  No new breast complaints or concerns today.    2/8/2024   Patient presenting to the office today with concerns regarding a new nodule in her left breast just below the nipple area that she found 1 week ago. Pea size, movable, sometimes can't find it.  No change since discovering it.  The area is sore to touch.    9/6/2024 interval history  Patient presenting to the office today for follow-up.  On 2/14/2024 she had a left diagnostic mammogram and left limited breast ultrasound that returned  as BI-RADS 4 for a suspicious mass in the left breast.  She went on to have a ultrasound vacuum-assisted biopsy on 2/26/2024 that returned as a fibroadenoma with associated PASH. on 8/30/2024 she had a left limited breast ultrasound that returned as BI-RADS 2 showing that the previous mass has been completely removed.    Review of Systems   Constitutional:  Positive for diaphoresis and unexpected weight change.        Weight gain   HENT:   Positive for tinnitus.    Psychiatric/Behavioral:  The patient is nervous/anxious.    All other systems reviewed and are negative.      Medications:    Current Outpatient Medications:     FLUoxetine (PROzac) 10 MG capsule, TAKE 1 CAPSULE BY MOUTH DAILY, Disp: 90 capsule, Rfl: 3    magnesium oxide (MAGOX) 400 (241.3 Mg) MG tablet tablet, Take 0.5 tablets by mouth Daily., Disp: , Rfl:     Probiotic Product (PROBIOTIC DAILY PO), Take 1 capsule by mouth Daily., Disp: , Rfl:     vitamin B-12 (CYANOCOBALAMIN) 1000 MCG tablet, Take 1 tablet by mouth Daily., Disp: 90 tablet, Rfl: 3    Vitamin D, Cholecalciferol, 50 MCG (2000 UT) capsule, Take 50 mcg by mouth Daily., Disp: 90 capsule, Rfl: 3    Current Facility-Administered Medications:     levalbuterol (XOPENEX) nebulizer solution 0.63 mg, 0.63 mg, Nebulization, Q6H PRN, Shayla Eli MD, 0.63 mg at 04/11/23 0848    Allergies:  No Known Allergies    Medical history:  Past Medical History:   Diagnosis Date    Dog bite of lower leg     right ankle     Hypothyroidism        Surgical History:  Past Surgical History:   Procedure Laterality Date    INCISION AND DRAINAGE LEG Right 4/28/2021    Procedure: right ankle/Achilles tendon irrigation, debridement/saucerization of calcaneus;  Surgeon: Thuan Perez Jr., MD;  [place] [facility] [place] [facility] [place]  Service: Orthopedics;  Laterality: Right;    WISDOM TOOTH EXTRACTION         Family History:  Family History   Problem Relation Age of Onset    Cancer Mother 67        lung     Heart disease Father      Malig Hyperthermia Neg Hx        Social History:   Social History     Socioeconomic History    Marital status:    Tobacco Use    Smoking status: Never     Passive exposure: Never    Smokeless tobacco: Never   Vaping Use    Vaping status: Never Used   Substance and Sexual Activity    Alcohol use: Yes     Comment: occasionally, prior to pregnancy    Drug use: No    Sexual activity: Defer     Partners: Male     Patient drinks 2 servings of caffeine per day.       GYNECOLOGIC HISTORY:   G: 3. P: 3. AB: 0.  Last menstrual period: 09/10/2022  Age at menarche: 11  Age at first childbirth: 38  Lactation/How lon year  Age at menopause: n/a  Total years of oral contraceptive use: never  Total years of hormone replacement therapy: never      Physical Exam  Vitals:    24 0915   BP: 104/62   Pulse: 51   SpO2: 100%         ECOG 0 - Asymptomatic  General: NAD, well appearing  Psych: a&o x 3, normal mood and affect  Eyes: EOMI, no scleral icterus  ENMT: neck supple without masses or thyromegaly, mucus membranes moist  Resp: normal effort, CTAB  CV: RRR, no murmurs, no edema   GI: soft, NT, ND  MSK: normal gait, normal ROM in bilateral shoulders  Lymph nodes:  no cervical, supraclavicular or axillary lymphadenopathy  Breast: symmetric, small  Right: No visible abnormalities on inspection while seated, with arms raised or hands on hips. No masses, skin changes, or nipple abnormalities.  Left: No visible abnormalities on inspection while seated, with arms raised or hands on hips. No masses, skin changes, or nipple abnormalities. 6:30 3 cmfn pea size nodule     Imagin2023 bilateral screening mammogram at [facility]  FINDINGS: Bilateral digital CC and MLO mammographic and digital  Tomosynthesis images were obtained. Comparison is made to prior studies  dated 2022 and 2021. The parenchyma of both breasts  demonstrates a heterogeneously dense pattern which lessens the  sensitivity. I see no new or dominant  masses, areas of architectural  distortion or skin thickening. There is no evidence for axillary  lymphadenopathy or nipple retraction.  IMPRESSION:  1. There is no evidence for malignancy or significant change in either  breast. Routine followup mammography is recommended.  Given the density  of the patient's breast tissue, correlation with screening bilateral  breast sonography and/or bilateral breast MRI may provide additional  benefit and should be considered.  BI-RADS category 1: Negative.     2/14/2024 left diagnostic mammogram and left limited breast ultrasound at [facility]  The breast is heterogeneously dense, which may obscure small masses.  There is no radiographic abnormality in the region of the palpable abnormality in the 6:30 o'clock region of the  left breast.  LEFT BREAST REALTIME LIMITED BREAST ULTRASOUND  High resolution real-time ultrasound scanning was performed by the ultrasound technologist. Still images were  obtained by the ultrasound technologist and submitted for radiologist review.  There is an oval hypoechoic heterogeneous mass with partially defined margins measuring 5 x 4 x 3 mm seen in the  6:30 o'clock region of the left breast located 5 centimeters from the nipple. Finding correlates to the palpable  abnormality in the 6:30 o'clock region of the left breast.  IMPRESSION:  Mass in the left breast is suspicious. Ultrasound guided biopsy is recommended.  The patient was mailed a notification letter.  BI-RADS Category 4B:     2/26/2024 left ultrasound-guided vacuum-assisted biopsy [facility]  Pathology results were called to the patient by Argelia Lennon RN on 02/27/2024 at 4:13 PM.  The results   and recommendations were discussed with the patient in depth.  The patient voiced understanding and had no   further questions.   A follow-up ultrasound in 6 months is recommended.    8/30/2024 left Limited breast ultrasound at [facility]  Follow-up examination was performed for the mass in the left breast,  6:30 o'clock seen on 02/14/2024. On the  present examination, there is a biopsy clip in the 6:30 o'clock region of the left breast located 5 centimeters  from the nipple.  An echogenic biopsy clip is seen, the previously biopsied mass has been completely removed. There  are no suspicious masses, areas of focal shadowing or distortion seen.  IMPRESSION:  Biopsy clip in the left breast is benign-negative.  A return to screening in 2 months is recommended.  BI-RADS Category 2: Benign      Assessment:    1)  Abnormal imaging- surveillance ended 9/13/22  2. Nipple discharge- resolved   3. Breast density  4. 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 I explained the concept of a BI-RADS 3 designation and the process of imaging surveillance. We discussed that a BI-RADS 3 designation describes an imaging finding that is 98-99% likely to represent a benign process. We discussed that the most common management of a BI-RADS 3 finding is initial 6 month imaging surveillance and that the entire period of imaging surveillance can often take 2 years. She is no longer in surveillance at this time.  2. We discussed types of nipple discharge. We discussed that physiologic discharge is usually bilateral, nonspontaneous, multi-ductal, and milky, but can be yellow, green or brown. That this can sometimes be associated with elevated prolactin levels and that elevated prolactin levels can have multiple causes (pregnancy, pituitary, thyroid, medications, etc).    We discussed that pathologic nipple discharge usually is unilateral, spontaneous, persistent, comes from a single duct, is bloody, clear, or serosanguinous, or is associated with a palpable or radiological evident breast mass.   We will get a breast MRI to look for a mammographically or sonographically occult lesion. If MRI finds a lesion, she may require a second-look US or MR-biopsy. If MRI is negative, then will proceed with ductogram.   3. Breast  density describes how the breasts look on a mammogram.  Breast and connective tissue are denser than fat and this difference shows up on the mammogram.  Young women often have dense breasts.  As we age, breast become less dense.  Dense breast can make it harder to find breast cancer on the mammogram.  Women with high breast density have an increased risk of breast cancer.  Educational materials regarding breast density were given and reviewed.  Tomosynthesis imaging will be completed with next screening study.      Plan:  1.  Screening mammogram in November I am not giving her a follow-up appointment in the office.  If the mammogram is normal she can return to yearly mammograms which can be ordered by her PCP or her OB/GYN.  If it is abnormal I will give her a follow-up in the office and plan accordingly   2. Monthly self exams     TIMOTHY Hernandez    I have spent 20 min in face to face time with the patient and in chart review.    CC:  TIMOTHY Santana Jill R, MD    EMR Dragon/transcription disclaimer:  Dictated using Dragon dictation

## 2024-09-06 ENCOUNTER — OFFICE VISIT (OUTPATIENT)
Dept: SURGERY | Facility: CLINIC | Age: 44
End: 2024-09-06
Payer: COMMERCIAL

## 2024-09-06 VITALS
DIASTOLIC BLOOD PRESSURE: 62 MMHG | HEART RATE: 51 BPM | BODY MASS INDEX: 20.88 KG/M2 | OXYGEN SATURATION: 100 % | HEIGHT: 68 IN | SYSTOLIC BLOOD PRESSURE: 104 MMHG | WEIGHT: 137.8 LBS

## 2024-09-06 DIAGNOSIS — Z12.31 ENCOUNTER FOR SCREENING MAMMOGRAM FOR MALIGNANT NEOPLASM OF BREAST: Primary | ICD-10-CM

## 2024-09-06 DIAGNOSIS — R92.8 ABNORMAL FINDING ON BREAST IMAGING: ICD-10-CM

## 2024-10-29 RX ORDER — FLUOXETINE 10 MG/1
10 CAPSULE ORAL DAILY
Qty: 90 CAPSULE | Refills: 0 | Status: SHIPPED | OUTPATIENT
Start: 2024-10-29

## 2025-01-07 ENCOUNTER — OFFICE VISIT (OUTPATIENT)
Dept: INTERNAL MEDICINE | Facility: CLINIC | Age: 45
End: 2025-01-07
Payer: COMMERCIAL

## 2025-01-07 VITALS
SYSTOLIC BLOOD PRESSURE: 122 MMHG | OXYGEN SATURATION: 98 % | DIASTOLIC BLOOD PRESSURE: 78 MMHG | HEART RATE: 55 BPM | WEIGHT: 142 LBS | HEIGHT: 68 IN | BODY MASS INDEX: 21.52 KG/M2

## 2025-01-07 DIAGNOSIS — Z23 NEED FOR VACCINATION: ICD-10-CM

## 2025-01-07 DIAGNOSIS — Z00.00 HEALTHCARE MAINTENANCE: Primary | ICD-10-CM

## 2025-01-07 PROCEDURE — 90632 HEPA VACCINE ADULT IM: CPT | Performed by: INTERNAL MEDICINE

## 2025-01-07 PROCEDURE — 99396 PREV VISIT EST AGE 40-64: CPT | Performed by: INTERNAL MEDICINE

## 2025-01-07 PROCEDURE — 90471 IMMUNIZATION ADMIN: CPT | Performed by: INTERNAL MEDICINE

## 2025-01-07 NOTE — PROGRESS NOTES
Subjective   CPE  Weight concerns  PTSD    Nicole Birch is a 44 y.o. female who presents for a complete physical exam, to review chronic issues, and to address any acute needs. Patient had PTSD from a dog attack about 3 years ago. Has been on prozac from that time. Previously engaged in therapy but not currently.   Patient is concerned about weight gain at this time.       Review of Systems   Constitutional: Negative.    HENT: Negative.     Eyes: Negative.    Respiratory: Negative.     Cardiovascular: Negative.    Gastrointestinal: Negative.    Endocrine: Negative.    Genitourinary: Negative.    Musculoskeletal: Negative.    Skin: Negative.    Allergic/Immunologic: Negative.    Neurological: Negative.    Hematological: Negative.    Psychiatric/Behavioral: Negative.         The following portions of the patient's history were reviewed and updated as appropriate: allergies, current medications, past family history, past medical history, past social history, past surgical history, and problem list.     Patient Active Problem List   Diagnosis    Axillary mass    Hypothyroidism    Vitamin D deficiency disease    Abnormal finding on breast imaging    Nipple discharge    Breast nodule       Past Medical History:   Diagnosis Date    Dog bite of lower leg     right ankle     Hypothyroidism        Past Surgical History:   Procedure Laterality Date    INCISION AND DRAINAGE LEG Right 4/28/2021    Procedure: right ankle/Achilles tendon irrigation, debridement/saucerization of calcaneus;  Surgeon: Thuan Perez Jr., MD;  Location: [facility] OR [facility] [place], [place];  Service: Orthopedics;  Laterality: Right;    WISDOM TOOTH EXTRACTION         Family History   Problem Relation Age of Onset    Cancer Mother 67        lung     Heart disease Father     Malig Hyperthermia Neg Hx        Social History     Socioeconomic History    Marital status:    Tobacco Use    Smoking status: Never     Passive exposure: Never    Smokeless tobacco: Never  "  Vaping Use    Vaping status: Never Used   Substance and Sexual Activity    Alcohol use: Yes     Comment: occasionally, prior to pregnancy    Drug use: No    Sexual activity: Defer     Partners: Male       Current Outpatient Medications on File Prior to Visit   Medication Sig Dispense Refill    FLUoxetine (PROzac) 10 MG capsule TAKE 1 CAPSULE BY MOUTH DAILY 90 capsule 0    magnesium oxide (MAGOX) 400 (241.3 Mg) MG tablet tablet Take 0.5 tablets by mouth Daily.      Probiotic Product (PROBIOTIC DAILY PO) Take 1 capsule by mouth Daily.      vitamin B-12 (CYANOCOBALAMIN) 1000 MCG tablet Take 1 tablet by mouth Daily. 90 tablet 3    Vitamin D, Cholecalciferol, 50 MCG (2000 UT) capsule Take 50 mcg by mouth Daily. 90 capsule 3     Current Facility-Administered Medications on File Prior to Visit   Medication Dose Route Frequency Provider Last Rate Last Admin    levalbuterol (XOPENEX) nebulizer solution 0.63 mg  0.63 mg Nebulization Q6H PRN Shayla Eli MD   0.63 mg at 04/11/23 0848       No Known Allergies    Immunization History   Administered Date(s) Administered    COVID-19 (MODERNA) 1st,2nd,3rd Dose Monovalent 03/17/2021, 04/14/2021    COVID-19 (PFIZER) Purple Cap Monovalent 11/23/2021    Flu Vaccine Quad PF >36MO 10/01/2024    Fluzone  >6mos 09/15/2017    Fluzone (or Fluarix & Flulaval for VFC) >6mos 11/28/2022    Influenza Injectable Mdck Pf Quad 09/14/2020    Influenza, Unspecified 09/14/2020, 11/28/2022    Tdap 07/07/2017       Objective     /78   Pulse 55   Ht 172.7 cm (68\")   Wt 64.4 kg (142 lb)   SpO2 98%   BMI 21.59 kg/m²     Physical Exam  Vitals and nursing note reviewed.   Constitutional:       Appearance: Normal appearance. She is well-developed.   HENT:      Head: Normocephalic and atraumatic.      Right Ear: Tympanic membrane and external ear normal.      Left Ear: Tympanic membrane and external ear normal.      Nose: Nose normal.      Mouth/Throat:      Mouth: Mucous membranes are moist. "   Eyes:      Extraocular Movements: Extraocular movements intact.      Pupils: Pupils are equal, round, and reactive to light.   Cardiovascular:      Rate and Rhythm: Normal rate and regular rhythm.      Pulses: Normal pulses.      Heart sounds: Normal heart sounds.   Pulmonary:      Effort: Pulmonary effort is normal. No respiratory distress.      Breath sounds: Normal breath sounds.   Abdominal:      General: Abdomen is flat.      Palpations: Abdomen is soft.   Musculoskeletal:         General: Normal range of motion.      Cervical back: Normal range of motion and neck supple.   Skin:     General: Skin is warm and dry.   Neurological:      General: No focal deficit present.      Mental Status: She is alert and oriented to person, place, and time.   Psychiatric:         Mood and Affect: Mood normal.         Behavior: Behavior normal.         Thought Content: Thought content normal.         Judgment: Judgment normal.         Assessment & Plan   Diagnoses and all orders for this visit:    1. Healthcare maintenance (Primary)    2. Need for vaccination  -     Hepatitis A Vaccine Adult IM        Discussion    Patient presents today for a CPE.  She is doing well today. Has weight concerns. Reassured that she does have a healthy weight for height at this time. She has shifted from heavy cardio to strength training and may be accruing more muscular mass. Encouraged to focus on lean proteint, lower carb nutrtion. To reintroduce some cardiac activity but continue strength training. May monitor waist circ or other metrics as well. If continued difficulties may consider modifying med for mood. To hydrate well. She will f/u in one year or prn.       Patient follows a healthy diet.   Patient follows an adequate exercise regimen. Mammogram is up to date.   Colon cancer screening is up to date.   Pap smears are performed by the patient's gynecologist.   Immunizations are up to date.   Discussed importance of preventative care  including vaccinations, age appropriate cancer screening, routine lab work, healthy diet, and active lifestyle.         No future appointments.

## 2025-02-05 RX ORDER — FLUOXETINE 10 MG/1
10 CAPSULE ORAL DAILY
Qty: 90 CAPSULE | Refills: 3 | Status: SHIPPED | OUTPATIENT
Start: 2025-02-05

## 2025-02-21 ENCOUNTER — PREP FOR SURGERY (OUTPATIENT)
Dept: OTHER | Facility: HOSPITAL | Age: 45
End: 2025-02-21
Payer: COMMERCIAL

## 2025-02-21 DIAGNOSIS — Z12.11 SCREENING FOR COLON CANCER: Primary | ICD-10-CM

## 2025-03-13 ENCOUNTER — HOSPITAL ENCOUNTER (OUTPATIENT)
Facility: HOSPITAL | Age: 45
Setting detail: HOSPITAL OUTPATIENT SURGERY
Discharge: HOME | End: 2025-03-13
Attending: SURGERY | Admitting: SURGERY
Payer: COMMERCIAL

## 2025-03-13 ENCOUNTER — ANESTHESIA EVENT (OUTPATIENT)
Dept: GASTROENTEROLOGY | Facility: HOSPITAL | Age: 45
End: 2025-03-13
Payer: COMMERCIAL

## 2025-03-13 ENCOUNTER — ANESTHESIA (OUTPATIENT)
Dept: GASTROENTEROLOGY | Facility: HOSPITAL | Age: 45
End: 2025-03-13
Payer: COMMERCIAL

## 2025-03-13 VITALS
HEIGHT: 68 IN | RESPIRATION RATE: 14 BRPM | OXYGEN SATURATION: 99 % | SYSTOLIC BLOOD PRESSURE: 110 MMHG | BODY MASS INDEX: 20.82 KG/M2 | WEIGHT: 137.4 LBS | HEART RATE: 63 BPM | DIASTOLIC BLOOD PRESSURE: 69 MMHG

## 2025-03-13 DIAGNOSIS — Z12.11 SCREENING FOR COLON CANCER: ICD-10-CM

## 2025-03-13 LAB
B-HCG UR QL: NEGATIVE
EXPIRATION DATE: NORMAL
INTERNAL NEGATIVE CONTROL: NEGATIVE
INTERNAL POSITIVE CONTROL: POSITIVE
Lab: NORMAL

## 2025-03-13 PROCEDURE — S0260 H&P FOR SURGERY: HCPCS | Performed by: SURGERY

## 2025-03-13 PROCEDURE — 25010000002 LIDOCAINE 2% SOLUTION: Performed by: NURSE ANESTHETIST, CERTIFIED REGISTERED

## 2025-03-13 PROCEDURE — 25010000002 PROPOFOL 200 MG/20ML EMULSION: Performed by: NURSE ANESTHETIST, CERTIFIED REGISTERED

## 2025-03-13 PROCEDURE — 45385 COLONOSCOPY W/LESION REMOVAL: CPT | Performed by: SURGERY

## 2025-03-13 PROCEDURE — 25010000002 PROPOFOL 1000 MG/100ML EMULSION: Performed by: NURSE ANESTHETIST, CERTIFIED REGISTERED

## 2025-03-13 PROCEDURE — 81025 URINE PREGNANCY TEST: CPT | Performed by: SURGERY

## 2025-03-13 PROCEDURE — 88305 TISSUE EXAM BY PATHOLOGIST: CPT | Performed by: SURGERY

## 2025-03-13 PROCEDURE — 25810000003 LACTATED RINGERS PER 1000 ML: Performed by: SURGERY

## 2025-03-13 PROCEDURE — 25010000002 GLYCOPYRROLATE 0.2 MG/ML SOLUTION: Performed by: NURSE ANESTHETIST, CERTIFIED REGISTERED

## 2025-03-13 RX ORDER — LIDOCAINE HYDROCHLORIDE 20 MG/ML
INJECTION, SOLUTION INFILTRATION; PERINEURAL AS NEEDED
Status: DISCONTINUED | OUTPATIENT
Start: 2025-03-13 | End: 2025-03-13 | Stop reason: SURG

## 2025-03-13 RX ORDER — SODIUM CHLORIDE, SODIUM LACTATE, POTASSIUM CHLORIDE, CALCIUM CHLORIDE 600; 310; 30; 20 MG/100ML; MG/100ML; MG/100ML; MG/100ML
30 INJECTION, SOLUTION INTRAVENOUS CONTINUOUS PRN
Status: DISCONTINUED | OUTPATIENT
Start: 2025-03-13 | End: 2025-03-13 | Stop reason: HOSPADM

## 2025-03-13 RX ORDER — PROPOFOL 10 MG/ML
INJECTION, EMULSION INTRAVENOUS AS NEEDED
Status: DISCONTINUED | OUTPATIENT
Start: 2025-03-13 | End: 2025-03-13 | Stop reason: SURG

## 2025-03-13 RX ORDER — FLUTICASONE PROPIONATE 50 MCG
1 SPRAY, SUSPENSION (ML) NASAL
COMMUNITY

## 2025-03-13 RX ORDER — PROPOFOL 10 MG/ML
INJECTION, EMULSION INTRAVENOUS CONTINUOUS PRN
Status: DISCONTINUED | OUTPATIENT
Start: 2025-03-13 | End: 2025-03-13 | Stop reason: SURG

## 2025-03-13 RX ORDER — GLYCOPYRROLATE 0.2 MG/ML
INJECTION INTRAMUSCULAR; INTRAVENOUS AS NEEDED
Status: DISCONTINUED | OUTPATIENT
Start: 2025-03-13 | End: 2025-03-13 | Stop reason: SURG

## 2025-03-13 RX ADMIN — GLYCOPYRROLATE 0.2 MG: 0.2 INJECTION INTRAMUSCULAR; INTRAVENOUS at 08:20

## 2025-03-13 RX ADMIN — LIDOCAINE HYDROCHLORIDE 60 MG: 20 INJECTION, SOLUTION INFILTRATION; PERINEURAL at 08:22

## 2025-03-13 RX ADMIN — PROPOFOL INJECTABLE EMULSION 100 MG: 10 INJECTION, EMULSION INTRAVENOUS at 08:26

## 2025-03-13 RX ADMIN — PROPOFOL 160 MCG/KG/MIN: 10 INJECTION, EMULSION INTRAVENOUS at 08:22

## 2025-03-13 RX ADMIN — SODIUM CHLORIDE, SODIUM LACTATE, POTASSIUM CHLORIDE, AND CALCIUM CHLORIDE 30 ML/HR: .6; .31; .03; .02 INJECTION, SOLUTION INTRAVENOUS at 08:05

## 2025-03-13 RX ADMIN — PROPOFOL INJECTABLE EMULSION 100 MG: 10 INJECTION, EMULSION INTRAVENOUS at 08:22

## 2025-03-13 NOTE — ANESTHESIA PREPROCEDURE EVALUATION
Anesthesia Evaluation     Patient summary reviewed and Nursing notes reviewed                Airway   Mallampati: II  Dental      Pulmonary - negative pulmonary ROS   Cardiovascular     ECG reviewed  Rhythm: regular  Rate: normal    (+) dysrhythmias (runner) Bradycardia      Neuro/Psych  (+) psychiatric history Anxiety  GI/Hepatic/Renal/Endo    (+) thyroid problem hypothyroidism    Musculoskeletal (-) negative ROS    Abdominal    Substance History   (+) alcohol use     OB/GYN negative ob/gyn ROS         Other                    Anesthesia Plan    ASA 1     MAC     intravenous induction     Anesthetic plan, risks, benefits, and alternatives have been provided, discussed and informed consent has been obtained with: patient.    CODE STATUS:

## 2025-03-13 NOTE — OP NOTE
Operative Note :  María Barajas MD      Nicole Birch  1980    Procedure Date: 03/13/25    Pre-op Diagnosis:  Screening for colon cancer [Z12.11]    Post-Operative Diagnosis:  Colon polyp    Procedure:   Flexible colonoscopy to the cecum with hot snare polypectomy    Surgeon: María Barajas MD    Assistant: None    Anesthesia:  MAC (monitored anesthetic care)    Estimated Blood Loss: Minimal    Specimens: Hepatic flexure polyp    Complications: None    Indications:  Mrs. Birch is a 44-year-old lady here for routine screening colonoscopy.  She has been counseled on the risks of the procedure which include but are not limited to bleeding, colon perforation, and missed pathology.  Despite these risks, she has consented to proceed.    Findings: 5 mm hepatic flexure polyp removed    Description of procedure:  The patient was brought to the endoscopy suite and oanh in the left lateral decubitus position.  Continuous propofol anesthesia was administered.  A surgical timeout was completed.  A digital rectal exam was performed, revealing no abnormalities.  An adult colonoscope was then inserted through the anus and passed under direct visualization to the level of the cecum.  The cecum was identified via the ileocecal valve as well as the appendiceal orifice.  The scope was then slowly withdrawn, examining all circumferential walls of the ascending, transverse, descending, and sigmoid colon.  There was a 5 mm polyp of the hepatic flexure removed using the hot snare and retrieved.  There were no other abnormalities throughout the colon or rectum, including no evidence of hemorrhoidal disease during scope retroflexion in the distal rectum.  The scope was then withdrawn and the colon desufflated.  The patient had a very good bowel prep and was transferred to the recovery area in stable condition.     Recommendations:  I will call the patient in 1 week or less with the pathology results of the one polyp  removed as this will determine when the next screening colonoscopy will be due.    María Barajas MD  General, Robotic, and Endoscopic Surgery  [facility]    [address], [address]  [place] [address]  P: [phone]  F: [phone]

## 2025-03-13 NOTE — ANESTHESIA POSTPROCEDURE EVALUATION
Patient: Nicole Birch    Procedure Summary       Date: 03/13/25 Room / Location: [facility] ENDOSCOPY 6 / [facility]    Anesthesia Start: 0817 Anesthesia Stop: 0853    Procedure: COLONOSCOPY INTO CECUM WITH HOT SNARE [POLYPECTOMY Diagnosis:       Screening for colon cancer      (Screening for colon cancer [Z12.11])    Surgeons: María Barajas MD Provider: Kel Purcell MD    Anesthesia Type: MAC ASA Status: 1            Anesthesia Type: MAC    Vitals  Vitals Value Taken Time   /69 03/13/25 09:10   Temp     Pulse 63 03/13/25 09:10   Resp 14 03/13/25 09:10   SpO2 99 % 03/13/25 09:10           Post Anesthesia Care and Evaluation    Patient location during evaluation: PACU  Patient participation: complete - patient participated  Level of consciousness: awake and alert  Pain management: adequate    Airway patency: patent  Anesthetic complications: No anesthetic complications    Cardiovascular status: acceptable  Respiratory status: acceptable  Hydration status: acceptable    Comments: --------------------            03/13/25               0910     --------------------   BP:       110/69     Pulse:      63       Resp:       14       SpO2:      99%      --------------------

## 2025-03-13 NOTE — DISCHARGE INSTRUCTIONS
For the next 24 hours patient needs to be with a responsible adult.    For 24 hours DO NOT drive, operate machinery, appliances, drink alcohol, make important decisions or sign legal documents.    Start with a light or bland diet if you are feeling sick to your stomach otherwise advance to regular diet as tolerated.    Follow recommendations on procedure report if provided by your doctor.    Call Dr Barajas for problems [phone]    Problems may include but not limited to: large amounts of bleeding, trouble breathing, repeated vomiting, severe unrelieved pain, fever or chills.

## 2025-03-13 NOTE — H&P
General Surgery  History and Physical    CC: Screening for colon cancer    HPI: The patient is a pleasant 44 y.o. year-old lady who presents today for a routine screening colonoscopy.  She has never previously undergone colonoscopic evaluation and denies any melena.  She has rare sporadic hematochezia, that she attributes to hemorrhoids.  She has no family history of colon cancer.    Past Medical History:   Hypothyroidism  History of PCOS  Anxiety    Past Surgical History:   Incision drainage right lower extremity  Jay tooth extraction    Medications:   Facility-Administered Medications Prior to Admission   Medication Dose Route Frequency Provider Last Rate Last Admin    levalbuterol (XOPENEX) nebulizer solution 0.63 mg  0.63 mg Nebulization Q6H PRN Shayla Eli MD   0.63 mg at 04/11/23 0848     Medications Prior to Admission   Medication Sig Dispense Refill Last Dose/Taking    FLUoxetine (PROzac) 10 MG capsule TAKE 1 CAPSULE BY MOUTH DAILY 90 capsule 3 3/13/2025    Probiotic Product (PROBIOTIC DAILY PO) Take 1 capsule by mouth Every Night.   Past Week    vitamin B-12 (CYANOCOBALAMIN) 1000 MCG tablet Take 1 tablet by mouth Daily. 90 tablet 3 3/12/2025 Morning    Vitamin D, Cholecalciferol, 50 MCG (2000 UT) capsule Take 50 mcg by mouth Daily. 90 capsule 3 3/12/2025 Morning    fluticasone (FLONASE) 50 MCG/ACT nasal spray Administer 1 spray into the nostril(s) as directed by provider.       magnesium oxide (MAGOX) 400 (241.3 Mg) MG tablet tablet Take 0.5 tablets by mouth Daily As Needed.   More than a month       Allergies: No known drug allergies    Family History: No family history of gastrointestinal malignancy in her parents or siblings, but her mother had lung cancer    Social History: , non-smoker, occasional alcohol use    ROS: A comprehensive review of systems was conducted and negative for melena or hematochezia  All other systems reviewed and negative    Physical Exam:  Vitals:    03/13/25 0734    BP: 104/60   Pulse: (!) 48   Resp: 18   SpO2: 100%     Height: 172 cm  Weight: 62 kg  BMI: 20.89  General: No acute distress, well-nourished & well-developed  HEAD: normocephalic, atraumatic  EYES: normal conjunctiva, sclera anicteric  EARS: grossly normal hearing  NECK: supple, no thyromegaly  CARDIOVASCULAR: regular rate and rhythm  RESPIRATORY: clear to auscultation bilaterally  GASTROINTESTINAL: soft, nontender, non-distended  PSYCHIATRIC: oriented x3, normal mood and affect    BMI is within normal parameters. No other follow-up for BMI required.    ASSESSMENT & PLAN  Mrs. Birch is a 44-year-old lady here for routine screening colonoscopy.  She has been counseled on the risks of the procedure which include but are not limited to bleeding, colon perforation, and missed pathology.  Despite these risks, she has consented to proceed.    María Barajas MD  General, Robotic, and Endoscopic Surgery  [facility]    [address], [address]  [place] [address]  P: [phone]  F: [phone]

## 2025-03-14 ENCOUNTER — TELEPHONE (OUTPATIENT)
Dept: SURGERY | Facility: CLINIC | Age: 45
End: 2025-03-14
Payer: COMMERCIAL

## 2025-03-14 LAB
CYTO UR: NORMAL
LAB AP CASE REPORT: NORMAL
PATH REPORT.FINAL DX SPEC: NORMAL
PATH REPORT.GROSS SPEC: NORMAL

## 2025-03-14 NOTE — TELEPHONE ENCOUNTER
I called the patient and left a voicemail on her cell phone regarding the benign pathology findings from colonoscopy yesterday.  The 1 polyp removed was hyperplastic with no adenomatous growth.  I would recommend she have a repeat screening colonoscopy in 10 years.    Rosalina, please update her health maintenance tab and recall pool to reflect a 10-year interval.    Thanks,  MARGOTH

## 2025-05-30 ENCOUNTER — OFFICE VISIT (OUTPATIENT)
Dept: INTERNAL MEDICINE | Facility: CLINIC | Age: 45
End: 2025-05-30
Payer: COMMERCIAL

## 2025-05-30 VITALS
RESPIRATION RATE: 16 BRPM | SYSTOLIC BLOOD PRESSURE: 114 MMHG | DIASTOLIC BLOOD PRESSURE: 60 MMHG | BODY MASS INDEX: 19.85 KG/M2 | HEIGHT: 68 IN | WEIGHT: 131 LBS | HEART RATE: 71 BPM

## 2025-05-30 DIAGNOSIS — R10.13 MIDEPIGASTRIC PAIN: Primary | ICD-10-CM

## 2025-05-30 PROCEDURE — 99214 OFFICE O/P EST MOD 30 MIN: CPT | Performed by: INTERNAL MEDICINE

## 2025-05-30 RX ORDER — PANTOPRAZOLE SODIUM 40 MG/1
40 TABLET, DELAYED RELEASE ORAL DAILY
Qty: 90 TABLET | Refills: 1 | Status: SHIPPED | OUTPATIENT
Start: 2025-05-30

## 2025-05-30 RX ORDER — SUCRALFATE 1 G/1
1 TABLET ORAL
Qty: 90 TABLET | Refills: 3 | Status: SHIPPED | OUTPATIENT
Start: 2025-05-30

## 2025-05-30 NOTE — PROGRESS NOTES
"Chief Complaint   Patient presents with    Abdominal Pain     Burning x 1 month        Abdominal Pain  Associated symptoms: diarrhea and nausea    Associated symptoms: no arthralgias, no constipation, no dysuria, no fever, no frequency, no hematuria, no myalgias and no vomiting       Nicole Birch is a 45 y.o. female presents for acute care. Patient notes approx one month history of gi upset. First started early may. She had more etoh than usual during [place] time. However, symptoms have persisted even w lifestyle modification. Notes a \"burning\" in her stomach. BM in general normal but can feel \"stuck\" at night then has bm in the morning.   Has tried mylanta v tums w/out full benefit.   Decreased appetite w weight loss and increased burping and gas. Eliminated coffee only green tea one glass, bland diet. No etoh in last one month.   Normal colonoscopy 3/25.   More anxious w changes in gi. She increased prozac w her therapist but still feeling anxious.     The following portions of the patient's history were reviewed and updated as appropriate: allergies, current medications, past family history, past medical history, past social history, past surgical history and problem list.  Current Outpatient Medications on File Prior to Visit   Medication Sig Dispense Refill    fluticasone (FLONASE) 50 MCG/ACT nasal spray Administer 1 spray into the nostril(s) as directed by provider.      magnesium oxide (MAGOX) 400 (241.3 Mg) MG tablet tablet Take 0.5 tablets by mouth Daily As Needed.      Probiotic Product (PROBIOTIC DAILY PO) Take 1 capsule by mouth Every Night.      vitamin B-12 (CYANOCOBALAMIN) 1000 MCG tablet Take 1 tablet by mouth Daily. 90 tablet 3    Vitamin D, Cholecalciferol, 50 MCG (2000 UT) capsule Take 50 mcg by mouth Daily. 90 capsule 3    FLUoxetine (PROzac) 10 MG capsule TAKE 1 CAPSULE BY MOUTH DAILY 90 capsule 3     Current Facility-Administered Medications on File Prior to Visit   Medication Dose Route " Frequency Provider Last Rate Last Admin    levalbuterol (XOPENEX) nebulizer solution 0.63 mg  0.63 mg Nebulization Q6H PRN Shayla Eli MD   0.63 mg at 04/11/23 0848     Review of Systems   Constitutional:  Negative for fever.   HENT: Negative.     Eyes: Negative.    Respiratory: Negative.     Cardiovascular: Negative.    Gastrointestinal:  Positive for abdominal pain, diarrhea and nausea. Negative for constipation and vomiting.   Endocrine: Negative.    Genitourinary:  Negative for dysuria, frequency and hematuria.   Musculoskeletal:  Negative for arthralgias and myalgias.   Allergic/Immunologic: Negative.    Neurological: Negative.    Hematological: Negative.    Psychiatric/Behavioral: Negative.         Objective   Physical Exam  Vitals and nursing note reviewed.   Constitutional:       Appearance: Normal appearance. She is well-developed.   HENT:      Head: Normocephalic and atraumatic.      Right Ear: Tympanic membrane and external ear normal.      Left Ear: Tympanic membrane and external ear normal.      Nose: Nose normal.      Mouth/Throat:      Mouth: Mucous membranes are moist.   Eyes:      Extraocular Movements: Extraocular movements intact.      Pupils: Pupils are equal, round, and reactive to light.   Cardiovascular:      Rate and Rhythm: Normal rate and regular rhythm.      Pulses: Normal pulses.      Heart sounds: Normal heart sounds.   Pulmonary:      Effort: Pulmonary effort is normal. No respiratory distress.      Breath sounds: Normal breath sounds.   Abdominal:      General: Abdomen is flat. There is no distension.      Palpations: Abdomen is soft. There is no mass.      Tenderness: There is no guarding or rebound.      Hernia: No hernia is present.      Comments: Mild discomfort to palpation in mid epigastric region. No acute pain.    Musculoskeletal:         General: Normal range of motion.      Cervical back: Normal range of motion and neck supple.   Skin:     General: Skin is warm and dry.  "  Neurological:      General: No focal deficit present.      Mental Status: She is alert and oriented to person, place, and time.   Psychiatric:         Mood and Affect: Mood normal.         Behavior: Behavior normal.         Thought Content: Thought content normal.         Judgment: Judgment normal.          /60   Pulse 71   Resp 16   Ht 172.7 cm (68\")   Wt 59.4 kg (131 lb)   BMI 19.92 kg/m²     Assessment & Plan   Diagnoses and all orders for this visit:    Midepigastric pain  -     H. Pylori Breath Test - Breath, Lung  -     CBC & Differential  -     Comprehensive Metabolic Panel  -     Amylase  -     Lipase  -     Ambulatory Referral to Gastroenterology    Other orders  -     sucralfate (Carafate) 1 g tablet; Take 1 tablet by mouth 3 (Three) Times a Day With Meals.  -     pantoprazole (Protonix) 40 MG EC tablet; Take 1 tablet by mouth Daily.      Patient w acute midepigastric pain and burning. Suspect deborah v gastritis v other. Will test listed labs to evaluate for pancreatitis v gb disease as well. Will test for h pylori and treat if present. Low acidity in diet. Remain upright after eating. Small portion. Continue to avoid caffeine, etoh, and nsaid. Will start carafate bid w food and pantoprazole bid x 7 d then qd. She will f/u in 3 weeks or prn. To GI if symptoms worsen or fail to improve.          "

## 2025-05-31 LAB
ALBUMIN SERPL-MCNC: 4.4 G/DL (ref 3.9–4.9)
ALP SERPL-CCNC: 66 IU/L (ref 44–121)
ALT SERPL-CCNC: 15 IU/L (ref 0–32)
AMYLASE SERPL-CCNC: 43 U/L (ref 31–110)
AST SERPL-CCNC: 12 IU/L (ref 0–40)
BASOPHILS # BLD AUTO: 0.1 X10E3/UL (ref 0–0.2)
BASOPHILS NFR BLD AUTO: 1 %
BILIRUB SERPL-MCNC: 0.5 MG/DL (ref 0–1.2)
BUN SERPL-MCNC: 11 MG/DL (ref 6–24)
BUN/CREAT SERPL: 14 (ref 9–23)
CALCIUM SERPL-MCNC: 9.2 MG/DL (ref 8.7–10.2)
CHLORIDE SERPL-SCNC: 101 MMOL/L (ref 96–106)
CO2 SERPL-SCNC: 23 MMOL/L (ref 20–29)
CREAT SERPL-MCNC: 0.76 MG/DL (ref 0.57–1)
EGFRCR SERPLBLD CKD-EPI 2021: 98 ML/MIN/1.73
EOSINOPHIL # BLD AUTO: 0.2 X10E3/UL (ref 0–0.4)
EOSINOPHIL NFR BLD AUTO: 3 %
ERYTHROCYTE [DISTWIDTH] IN BLOOD BY AUTOMATED COUNT: 11.8 % (ref 11.7–15.4)
GLOBULIN SER CALC-MCNC: 1.9 G/DL (ref 1.5–4.5)
GLUCOSE SERPL-MCNC: 86 MG/DL (ref 70–99)
HCT VFR BLD AUTO: 42.7 % (ref 34–46.6)
HGB BLD-MCNC: 14.2 G/DL (ref 11.1–15.9)
IMM GRANULOCYTES # BLD AUTO: 0 X10E3/UL (ref 0–0.1)
IMM GRANULOCYTES NFR BLD AUTO: 0 %
LIPASE SERPL-CCNC: 31 U/L (ref 14–72)
LYMPHOCYTES # BLD AUTO: 1.8 X10E3/UL (ref 0.7–3.1)
LYMPHOCYTES NFR BLD AUTO: 30 %
MCH RBC QN AUTO: 32.3 PG (ref 26.6–33)
MCHC RBC AUTO-ENTMCNC: 33.3 G/DL (ref 31.5–35.7)
MCV RBC AUTO: 97 FL (ref 79–97)
MONOCYTES # BLD AUTO: 0.6 X10E3/UL (ref 0.1–0.9)
MONOCYTES NFR BLD AUTO: 10 %
NEUTROPHILS # BLD AUTO: 3.3 X10E3/UL (ref 1.4–7)
NEUTROPHILS NFR BLD AUTO: 56 %
PLATELET # BLD AUTO: 256 X10E3/UL (ref 150–450)
POTASSIUM SERPL-SCNC: 4.3 MMOL/L (ref 3.5–5.2)
PROT SERPL-MCNC: 6.3 G/DL (ref 6–8.5)
RBC # BLD AUTO: 4.39 X10E6/UL (ref 3.77–5.28)
SODIUM SERPL-SCNC: 140 MMOL/L (ref 134–144)
UREA BREATH TEST QL: NEGATIVE
WBC # BLD AUTO: 6.1 X10E3/UL (ref 3.4–10.8)

## 2025-06-03 DIAGNOSIS — R10.13 MIDEPIGASTRIC PAIN: Primary | ICD-10-CM

## 2025-06-03 DIAGNOSIS — R11.0 NAUSEA: ICD-10-CM

## 2025-06-03 DIAGNOSIS — R10.13 EPIGASTRIC PAIN: Primary | ICD-10-CM

## 2025-06-05 ENCOUNTER — OFFICE VISIT (OUTPATIENT)
Dept: GASTROENTEROLOGY | Facility: CLINIC | Age: 45
End: 2025-06-05
Payer: COMMERCIAL

## 2025-06-05 ENCOUNTER — PREP FOR SURGERY (OUTPATIENT)
Dept: SURGERY | Facility: SURGERY CENTER | Age: 45
End: 2025-06-05
Payer: COMMERCIAL

## 2025-06-05 VITALS
TEMPERATURE: 97.8 F | WEIGHT: 131.1 LBS | OXYGEN SATURATION: 99 % | SYSTOLIC BLOOD PRESSURE: 114 MMHG | BODY MASS INDEX: 19.87 KG/M2 | DIASTOLIC BLOOD PRESSURE: 60 MMHG | HEART RATE: 63 BPM | HEIGHT: 68 IN

## 2025-06-05 DIAGNOSIS — R11.0 NAUSEA: ICD-10-CM

## 2025-06-05 DIAGNOSIS — R10.13 EPIGASTRIC PAIN: Primary | ICD-10-CM

## 2025-06-05 DIAGNOSIS — R63.4 WEIGHT LOSS, ABNORMAL: ICD-10-CM

## 2025-06-05 RX ORDER — FLUOXETINE HYDROCHLORIDE 40 MG/1
CAPSULE ORAL
COMMUNITY
Start: 2025-06-04

## 2025-06-05 RX ORDER — LEVONORGESTREL 52 MG/1
INTRAUTERINE DEVICE INTRAUTERINE
COMMUNITY

## 2025-06-05 RX ORDER — CLONAZEPAM 0.5 MG/1
TABLET ORAL
COMMUNITY
Start: 2025-05-09

## 2025-06-05 RX ORDER — SODIUM CHLORIDE 0.9 % (FLUSH) 0.9 %
3 SYRINGE (ML) INJECTION EVERY 12 HOURS SCHEDULED
Status: CANCELLED | OUTPATIENT
Start: 2025-06-05

## 2025-06-05 RX ORDER — ONDANSETRON 4 MG/1
4 TABLET, FILM COATED ORAL EVERY 8 HOURS PRN
Qty: 30 TABLET | Refills: 1 | Status: SHIPPED | OUTPATIENT
Start: 2025-06-05 | End: 2025-06-25

## 2025-06-05 RX ORDER — SODIUM CHLORIDE, SODIUM LACTATE, POTASSIUM CHLORIDE, CALCIUM CHLORIDE 600; 310; 30; 20 MG/100ML; MG/100ML; MG/100ML; MG/100ML
30 INJECTION, SOLUTION INTRAVENOUS CONTINUOUS PRN
Status: CANCELLED | OUTPATIENT
Start: 2025-06-05 | End: 2025-06-05

## 2025-06-05 RX ORDER — SODIUM CHLORIDE 0.9 % (FLUSH) 0.9 %
10 SYRINGE (ML) INJECTION AS NEEDED
Status: CANCELLED | OUTPATIENT
Start: 2025-06-05

## 2025-06-05 NOTE — H&P (VIEW-ONLY)
"Chief Complaint   Patient presents with    Abdominal Pain         History of Present Illness  45-year-old female presenting for evaluation of epigastric pain.    Evaluation by PCP includes negative H. pylori test, unremarkable labs.  CT pending scheduling.    Patient reports about 1 month ago, began having epigastric discomfort that she describes as a burning sensation.  Can occur both with or without food, but worse after eating and will last for about 30 minutes.  She reports associated nausea.  The pain does not radiate.  She initially felt this was related to her anxiety and had her Prozac dose adjusted to 40 mg.  She sees psychiatry regularly.  She has been started on pantoprazole and sucralfate with minimal relief.  Has been taking this for 1 week.  She reports associated weight loss of about 10 pounds in the last month.  She reports that she is forcing herself to eat.  She has eliminated fried foods, coffee.  She is having a hard time meeting her caloric demands.  She has never experienced symptoms like this before.  No NSAIDs.      With Prozac increased, has been having more frequent bowel movements, but denies diarrhea.  No rectal bleeding or melena.     Result Review :         CT abdomen w wo contrast (06/03/2025 12:57)  pending    Lipase (05/30/2025 11:03) 31  Amylase (05/30/2025 11:03) 43  Comprehensive Metabolic Panel (05/30/2025 11:03) normal  CBC & Differential (05/30/2025 11:03) normal  H. Pylori Breath Test - Breath, Lung (05/30/2025 11:03) negative    Progress Notes by Shayla Eli MD (05/30/2025 10:30)    Colonoscopy (03/13/2025 08:12) normal recall 10y  Vital Signs:   /60   Pulse 63   Temp 97.8 °F (36.6 °C)   Ht 172.7 cm (68\")   Wt 59.5 kg (131 lb 1.6 oz)   SpO2 99%   BMI 19.93 kg/m²     Body mass index is 19.93 kg/m².     Physical Exam  Vitals reviewed.   Constitutional:       General: She is not in acute distress.     Appearance: She is well-developed.   HENT:      Head: " Normocephalic and atraumatic.   Pulmonary:      Effort: Pulmonary effort is normal. No respiratory distress.   Abdominal:      General: Abdomen is flat. There is no distension.      Tenderness: There is no abdominal tenderness. There is no guarding or rebound. Negative signs include Waller's sign.      Hernia: No hernia is present.   Skin:     General: Skin is dry.      Coloration: Skin is not pale.   Neurological:      Mental Status: She is alert and oriented to person, place, and time.   Psychiatric:         Thought Content: Thought content normal.           Assessment and Plan    Diagnoses and all orders for this visit:    1. Epigastric pain (Primary)    2. Weight loss, abnormal    3. Nausea  -     ondansetron (ZOFRAN) 4 MG tablet; Take 1 tablet by mouth Every 8 (Eight) Hours As Needed for Nausea or Vomiting for up to 20 days.  Dispense: 30 tablet; Refill: 1         Discussion    Patient presents for evaluation regarding burning epigastric pain and weight loss of about 10 pounds in the last month.  Negative labs, CT pending.  Plan EGD for further evaluation.  She can continue with PPI sucralfate for now, but recommend she hold sucralfate prior to her EGD.  Zofran as needed.    She sees psychiatry routinely and recently had Prozac dose increased.  She does feel that anxiety contributes to her symptoms.    Consider gallbladder workup if EGD negative and symptoms persist.            Follow Up   No follow-ups on file.    Patient Instructions   Schedule EGD for further evaluation of symptoms.     Continue pantoprazole and sucralfate. Hold sucralfate x5 days before your procedure.     Zofran as needed for nausea.     Follow-up after testing complete.  Call for any new or worsening symptoms.

## 2025-06-05 NOTE — PATIENT INSTRUCTIONS
Schedule EGD for further evaluation of symptoms.     Continue pantoprazole and sucralfate. Hold sucralfate x5 days before your procedure.     Zofran as needed for nausea.     Follow-up after testing complete.  Call for any new or worsening symptoms.

## 2025-06-06 NOTE — SIGNIFICANT NOTE
Education provided the Patient on the following:    - Nothing to Eat or Drink after MN the night before the procedure  - You will need to have someone drive you home after your EGD and remain with you for 24 hours after the EGD  - The date of your EGD, your are welcome to have one visitor at bedside or remain within 10-15 minutes of [facility]  - Please wear warm socks when you arrive for your EGD  - Remove all jewelry and leave any valuables before arriving on the date of your procedure (all will have to be removed before leaving preop)  - You will need to arrive at 0830 on 6-9-25 for your EGD at [facility] located at [facility], [facility].  - Feel free to contact us at: [phone] with any additional questions/concerns

## 2025-06-09 ENCOUNTER — HOSPITAL ENCOUNTER (OUTPATIENT)
Facility: SURGERY CENTER | Age: 45
Setting detail: HOSPITAL OUTPATIENT SURGERY
Discharge: HOME | End: 2025-06-09
Attending: INTERNAL MEDICINE | Admitting: INTERNAL MEDICINE
Payer: COMMERCIAL

## 2025-06-09 ENCOUNTER — ANESTHESIA (OUTPATIENT)
Dept: SURGERY | Facility: SURGERY CENTER | Age: 45
End: 2025-06-09
Payer: COMMERCIAL

## 2025-06-09 ENCOUNTER — ANESTHESIA EVENT (OUTPATIENT)
Dept: SURGERY | Facility: SURGERY CENTER | Age: 45
End: 2025-06-09
Payer: COMMERCIAL

## 2025-06-09 VITALS
BODY MASS INDEX: 19.61 KG/M2 | HEART RATE: 61 BPM | WEIGHT: 129 LBS | OXYGEN SATURATION: 98 % | SYSTOLIC BLOOD PRESSURE: 103 MMHG | TEMPERATURE: 98.9 F | DIASTOLIC BLOOD PRESSURE: 59 MMHG | RESPIRATION RATE: 18 BRPM

## 2025-06-09 DIAGNOSIS — R10.13 EPIGASTRIC PAIN: ICD-10-CM

## 2025-06-09 DIAGNOSIS — R63.4 WEIGHT LOSS, ABNORMAL: ICD-10-CM

## 2025-06-09 PROCEDURE — 81025 URINE PREGNANCY TEST: CPT | Performed by: INTERNAL MEDICINE

## 2025-06-09 PROCEDURE — 43239 EGD BIOPSY SINGLE/MULTIPLE: CPT | Performed by: INTERNAL MEDICINE

## 2025-06-09 PROCEDURE — 25010000002 PROPOFOL 10 MG/ML EMULSION: Performed by: NURSE ANESTHETIST, CERTIFIED REGISTERED

## 2025-06-09 PROCEDURE — 88305 TISSUE EXAM BY PATHOLOGIST: CPT | Performed by: INTERNAL MEDICINE

## 2025-06-09 PROCEDURE — 25810000003 LACTATED RINGERS PER 1000 ML

## 2025-06-09 RX ORDER — SODIUM CHLORIDE, SODIUM LACTATE, POTASSIUM CHLORIDE, CALCIUM CHLORIDE 600; 310; 30; 20 MG/100ML; MG/100ML; MG/100ML; MG/100ML
30 INJECTION, SOLUTION INTRAVENOUS CONTINUOUS PRN
Status: DISCONTINUED | OUTPATIENT
Start: 2025-06-09 | End: 2025-06-09 | Stop reason: HOSPADM

## 2025-06-09 RX ORDER — SODIUM CHLORIDE 0.9 % (FLUSH) 0.9 %
3 SYRINGE (ML) INJECTION EVERY 12 HOURS SCHEDULED
Status: DISCONTINUED | OUTPATIENT
Start: 2025-06-09 | End: 2025-06-09 | Stop reason: HOSPADM

## 2025-06-09 RX ORDER — SODIUM CHLORIDE 0.9 % (FLUSH) 0.9 %
10 SYRINGE (ML) INJECTION AS NEEDED
Status: DISCONTINUED | OUTPATIENT
Start: 2025-06-09 | End: 2025-06-09 | Stop reason: HOSPADM

## 2025-06-09 RX ADMIN — PROPOFOL 200 MCG/KG/MIN: 10 INJECTION, EMULSION INTRAVENOUS at 09:36

## 2025-06-09 RX ADMIN — SODIUM CHLORIDE, POTASSIUM CHLORIDE, SODIUM LACTATE AND CALCIUM CHLORIDE 30 ML/HR: 600; 310; 30; 20 INJECTION, SOLUTION INTRAVENOUS at 09:11

## 2025-06-09 NOTE — ANESTHESIA PREPROCEDURE EVALUATION
Anesthesia Evaluation     NPO Solid Status: > 8 hours  NPO Liquid Status: > 8 hours           Airway   Mallampati: I  No difficulty expected  Dental      Pulmonary    Cardiovascular   Exercise tolerance: good (4-7 METS)        Neuro/Psych  (+) psychiatric history  GI/Hepatic/Renal/Endo    (+) thyroid problem     Musculoskeletal     Abdominal    Substance History      OB/GYN          Other                    Anesthesia Plan    ASA 2     MAC     intravenous induction     Anesthetic plan, risks, benefits, and alternatives have been provided, discussed and informed consent has been obtained with: patient.    CODE STATUS:

## 2025-06-09 NOTE — ANESTHESIA POSTPROCEDURE EVALUATION
Patient: Nicole Birch    Procedure Summary       Date: 06/09/25 Room / Location[facility]    Anesthesia Start: 0934 Anesthesia Stop: 0952    Procedure: ESOPHAGOGASTRODUODENOSCOPY Diagnosis:       Epigastric pain      Weight loss, abnormal      (Epigastric pain [R10.13])      (Weight loss, abnormal [R63.4])    Surgeons: Jermaine Cabrera MD Provider: Flash Vega MD    Anesthesia Type: MAC ASA Status: 2            Anesthesia Type: MAC    Vitals  Vitals Value Taken Time   /59 06/09/25 10:10   Temp 37.2 °C (98.9 °F) 06/09/25 09:50   Pulse 61 06/09/25 10:10   Resp 18 06/09/25 10:10   SpO2 98 % 06/09/25 10:10           Post Anesthesia Care and Evaluation    Patient location during evaluation: PACU  Patient participation: complete - patient participated  Level of consciousness: awake  Pain management: adequate    Airway patency: patent  Anesthetic complications: No anesthetic complications  PONV Status: none  Cardiovascular status: acceptable  Respiratory status: acceptable  Hydration status: acceptable

## 2025-06-13 ENCOUNTER — OFFICE VISIT (OUTPATIENT)
Dept: INTERNAL MEDICINE | Facility: CLINIC | Age: 45
End: 2025-06-13
Payer: COMMERCIAL

## 2025-06-13 VITALS
BODY MASS INDEX: 19.1 KG/M2 | DIASTOLIC BLOOD PRESSURE: 68 MMHG | WEIGHT: 126 LBS | SYSTOLIC BLOOD PRESSURE: 110 MMHG | HEART RATE: 57 BPM | OXYGEN SATURATION: 98 % | HEIGHT: 68 IN

## 2025-06-13 DIAGNOSIS — R10.13 EPIGASTRIC PAIN: Primary | ICD-10-CM

## 2025-06-13 DIAGNOSIS — F41.9 ANXIETY: ICD-10-CM

## 2025-06-13 DIAGNOSIS — R51.9 NONINTRACTABLE EPISODIC HEADACHE, UNSPECIFIED HEADACHE TYPE: ICD-10-CM

## 2025-06-13 DIAGNOSIS — G44.86 CERVICOGENIC HEADACHE: ICD-10-CM

## 2025-06-13 NOTE — PROGRESS NOTES
Chief Complaint   Patient presents with    Abdominal Pain     Better      Headache    Anxiety       History of Present Illness   Nicole Birch is a 45 y.o. female presents for follow up evaluation. She notes improvement today. She was having nausea, burning, early satiety. She no longer is having any burning. Still w some early satiety and weight loss. Therapist has increased prozac dosing as she has history of anxiety ramping up when having health concerns. She notes that she does not feel as anxious this time but not at her best mental health space. She is engaged in fitness but is moderating cv activity due to recent weight loss.   Additional c/o evening headaches. Similar to prior. Wearing bite guard. Benefit w PT in the past for same.     The following portions of the patient's history were reviewed and updated as appropriate: allergies, current medications, past family history, past medical history, past social history, past surgical history and problem list.  Current Outpatient Medications on File Prior to Visit   Medication Sig Dispense Refill    clonazePAM (KlonoPIN) 0.5 MG tablet       fluticasone (FLONASE) 50 MCG/ACT nasal spray Administer 1 spray into the nostril(s) as directed by provider.      Levonorgestrel (Mirena, 52 MG,) 20 MCG/DAY intrauterine device IUD BIRTH CONTROL IUD      ondansetron (ZOFRAN) 4 MG tablet Take 1 tablet by mouth Every 8 (Eight) Hours As Needed for Nausea or Vomiting for up to 20 days. 30 tablet 1    pantoprazole (Protonix) 40 MG EC tablet Take 1 tablet by mouth Daily. 90 tablet 1    Probiotic Product (PROBIOTIC DAILY PO) Take 1 capsule by mouth Every Night.      PROzac 40 MG capsule       vitamin B-12 (CYANOCOBALAMIN) 1000 MCG tablet Take 1 tablet by mouth Daily. 90 tablet 3    Vitamin D, Cholecalciferol, 50 MCG (2000 UT) capsule Take 50 mcg by mouth Daily. 90 capsule 3    [DISCONTINUED] sucralfate (Carafate) 1 g tablet Take 1 tablet by mouth 3 (Three) Times a Day With  Meals. 90 tablet 3     No current facility-administered medications on file prior to visit.     Review of Systems   Constitutional:  Negative for fever.   HENT: Negative.     Eyes: Negative.    Respiratory: Negative.     Cardiovascular: Negative.    Gastrointestinal:  Positive for abdominal pain and nausea. Negative for constipation, diarrhea and vomiting.   Endocrine: Negative.    Genitourinary:  Negative for dysuria, frequency and hematuria.   Musculoskeletal:  Negative for arthralgias and myalgias.   Skin: Negative.    Allergic/Immunologic: Negative.    Neurological: Negative.    Hematological: Negative.    Psychiatric/Behavioral:  The patient is nervous/anxious.        Objective   Physical Exam  Vitals and nursing note reviewed.   Constitutional:       Appearance: Normal appearance.   HENT:      Head: Normocephalic and atraumatic.      Right Ear: Tympanic membrane normal.      Left Ear: Tympanic membrane normal.      Nose: Nose normal.      Mouth/Throat:      Mouth: Mucous membranes are moist.   Eyes:      Extraocular Movements: Extraocular movements intact.      Pupils: Pupils are equal, round, and reactive to light.   Cardiovascular:      Rate and Rhythm: Normal rate and regular rhythm.      Pulses: Normal pulses.      Heart sounds: Normal heart sounds.   Pulmonary:      Effort: Pulmonary effort is normal.      Breath sounds: Normal breath sounds.   Abdominal:      General: Abdomen is flat. Bowel sounds are normal.      Palpations: Abdomen is soft. There is no mass.      Tenderness: There is no abdominal tenderness. There is no guarding or rebound.   Musculoskeletal:         General: Normal range of motion.      Cervical back: Normal range of motion.   Skin:     General: Skin is warm and dry.   Neurological:      General: No focal deficit present.      Mental Status: She is alert and oriented to person, place, and time.   Psychiatric:         Mood and Affect: Mood normal.         Behavior: Behavior normal.     "     Thought Content: Thought content normal.         Judgment: Judgment normal.          /68   Pulse 57   Ht 172.7 cm (68\")   Wt 57.2 kg (126 lb)   LMP  (LMP Unknown)   SpO2 98%   BMI 19.16 kg/m²     Assessment & Plan   Diagnoses and all orders for this visit:    Epigastric pain    Nonintractable episodic headache, unspecified headache type  -     Ambulatory Referral to Physical Therapy for Evaluation & Treatment    Cervicogenic headache  -     Ambulatory Referral to Physical Therapy for Evaluation & Treatment    Anxiety    Patient w abdominal pain/ SEEMA. She is to pantoprazole for now. She will remain off carafate. She is on prozac for mood and discussed that this can contribute to GI symptoms. She will continue to follow with psychiatry. She is to have CT as ordered and start a symptom diary. F/u w GI as scheduled.   Will start PT for cervicogenic headache and maximize posturing throughout the day to avoid neck and shoulder tension.   She will try healthy caloric supplementation to meals (ie protein shake/ boost) after eating to ensure healthy intake. Nutrtion referral if continued weight loss.              "

## 2025-07-03 ENCOUNTER — HOSPITAL ENCOUNTER (OUTPATIENT)
Dept: CT IMAGING | Facility: HOSPITAL | Age: 45
Discharge: HOME | End: 2025-07-03
Admitting: INTERNAL MEDICINE
Payer: COMMERCIAL

## 2025-07-03 PROCEDURE — 25510000001 IOPAMIDOL 61 % SOLUTION: Performed by: INTERNAL MEDICINE

## 2025-07-03 PROCEDURE — 74160 CT ABDOMEN W/CONTRAST: CPT

## 2025-07-03 RX ORDER — IOPAMIDOL 612 MG/ML
100 INJECTION, SOLUTION INTRAVASCULAR
Status: COMPLETED | OUTPATIENT
Start: 2025-07-03 | End: 2025-07-03

## 2025-07-03 RX ADMIN — IOPAMIDOL 85 ML: 612 INJECTION, SOLUTION INTRAVENOUS at 10:45

## 2025-07-08 ENCOUNTER — APPOINTMENT (OUTPATIENT)
Dept: WOMENS IMAGING | Facility: HOSPITAL | Age: 45
End: 2025-07-08
Payer: COMMERCIAL

## 2025-07-08 PROCEDURE — 77065 DX MAMMO INCL CAD UNI: CPT | Performed by: RADIOLOGY

## 2025-07-08 PROCEDURE — 77061 BREAST TOMOSYNTHESIS UNI: CPT | Performed by: RADIOLOGY

## 2025-07-08 PROCEDURE — 76642 ULTRASOUND BREAST LIMITED: CPT | Performed by: RADIOLOGY

## 2025-07-08 PROCEDURE — G0279 TOMOSYNTHESIS, MAMMO: HCPCS | Performed by: RADIOLOGY

## 2025-07-24 ENCOUNTER — APPOINTMENT (OUTPATIENT)
Dept: WOMENS IMAGING | Facility: HOSPITAL | Age: 45
End: 2025-07-24
Payer: COMMERCIAL

## 2025-07-24 PROCEDURE — 76942 ECHO GUIDE FOR BIOPSY: CPT | Performed by: RADIOLOGY

## 2025-07-24 PROCEDURE — 19000 PUNCTURE ASPIR CYST BREAST: CPT | Performed by: RADIOLOGY

## 2025-08-04 ENCOUNTER — OFFICE VISIT (OUTPATIENT)
Dept: SURGERY | Facility: CLINIC | Age: 45
End: 2025-08-04
Payer: COMMERCIAL

## 2025-08-04 ENCOUNTER — TELEPHONE (OUTPATIENT)
Dept: SURGERY | Facility: CLINIC | Age: 45
End: 2025-08-04
Payer: COMMERCIAL

## 2025-08-04 VITALS
BODY MASS INDEX: 20.46 KG/M2 | HEART RATE: 60 BPM | RESPIRATION RATE: 16 BRPM | SYSTOLIC BLOOD PRESSURE: 114 MMHG | WEIGHT: 135 LBS | DIASTOLIC BLOOD PRESSURE: 56 MMHG | HEIGHT: 68 IN | OXYGEN SATURATION: 98 %

## 2025-08-04 DIAGNOSIS — R92.8 ABNORMAL FINDING ON BREAST IMAGING: ICD-10-CM

## 2025-08-04 DIAGNOSIS — Z12.31 ENCOUNTER FOR SCREENING MAMMOGRAM FOR MALIGNANT NEOPLASM OF BREAST: Primary | ICD-10-CM

## 2025-08-04 PROCEDURE — 99213 OFFICE O/P EST LOW 20 MIN: CPT | Performed by: NURSE PRACTITIONER

## (undated) DEVICE — CANN O2 ETCO2 FITS ALL CONN CO2 SMPL A/ 7IN DISP LF

## (undated) DEVICE — CULT AER/ANAEROB FASTIDIOUS BACT

## (undated) DEVICE — GLV SURG PREMIERPRO ORTHO LTX PF SZ8 BRN

## (undated) DEVICE — BLD SURG BARD/PARKER SZ15C SS STRL

## (undated) DEVICE — PK ORTHO MINOR TOWER 40

## (undated) DEVICE — GOWN,REINF,POLY,SIRUS,BREATH SLV,XLNG/XL: Brand: MEDLINE

## (undated) DEVICE — SOL ISO/ALC RUB 70PCT 4OZ

## (undated) DEVICE — APPL CHLORAPREP HI/LITE 26ML ORNG

## (undated) DEVICE — SPNG LAP 18X18IN LF STRL PK/5

## (undated) DEVICE — GLV SURG BIOGEL LTX PF 8

## (undated) DEVICE — SINGLE-USE BIOPSY FORCEPS: Brand: RADIAL JAW 4

## (undated) DEVICE — TRAP FLD MINIVAC MEGADYNE 100ML

## (undated) DEVICE — BLCK/BITE BLOX W/DENTL/RIM W/STRAP 54F

## (undated) DEVICE — TUBING, SUCTION, 1/4" X 10', STRAIGHT: Brand: MEDLINE

## (undated) DEVICE — STCKNT IMPERV 12IN STRL

## (undated) DEVICE — SNAR POLYP SENSATION STDOVL 27 240 BX40

## (undated) DEVICE — SUT PDS 3/0 SH 27IN DYED Z316H

## (undated) DEVICE — SUT MNCRYL 3/0 SH 27 IN Y416H

## (undated) DEVICE — GOWN,SIRUS,NONRNF,3XL,18/CS: Brand: MEDLINE

## (undated) DEVICE — Device

## (undated) DEVICE — THE SINGLE USE ETRAP – POLYP TRAP IS USED FOR SUCTION RETRIEVAL OF ENDOSCOPICALLY REMOVED POLYPS.: Brand: ETRAP

## (undated) DEVICE — DRAPE,U/ SHT,SPLIT,PLAS,STERIL: Brand: MEDLINE

## (undated) DEVICE — DISPOSABLE TOURNIQUET CUFF SINGLE BLADDER, SINGLE PORT AND QUICK CONNECT CONNECTOR: Brand: COLOR CUFF

## (undated) DEVICE — ADAPT CLN SCPE ENDO PORPOISE BX/50 DISP

## (undated) DEVICE — GLV SURG SIGNATURE ESSENTIAL PF LTX SZ8

## (undated) DEVICE — SENSR O2 OXIMAX FNGR A/ 18IN NONSTR

## (undated) DEVICE — PENCL E/S ULTRAVAC TELESCP NOSE HOLSTR 10FT

## (undated) DEVICE — KT ORCA ORCAPOD DISP STRL

## (undated) DEVICE — SUT VIC 3/0 CT2 27IN J232H

## (undated) DEVICE — SPNG GZ WOVN 4X4IN 12PLY 10/BX STRL

## (undated) DEVICE — VIAL FORMLN CAP 10PCT 20ML

## (undated) DEVICE — MSK ENDO PORT O2 POM ELITE CURAPLEX A/

## (undated) DEVICE — ADAPT CLN BIOGUARD AIR/H2O DISP

## (undated) DEVICE — SUT ETHLN 2/0 PS 18IN 585H

## (undated) DEVICE — UNDERCAST PADDING: Brand: DEROYAL

## (undated) DEVICE — DRP C/ARMOR

## (undated) DEVICE — PATIENT RETURN ELECTRODE, SINGLE-USE, CONTACT QUALITY MONITORING, ADULT, WITH 9FT CORD, FOR PATIENTS WEIGING OVER 33LBS. (15KG): Brand: MEGADYNE

## (undated) DEVICE — DRSNG GZ PETROLTM XEROFORM CURAD 1X8IN STRL

## (undated) DEVICE — SUT ETHLN 3/0 PS1 18IN 1663H

## (undated) DEVICE — GOWN,SIRUS,NON REINFRCD,LARGE,SET IN SL: Brand: MEDLINE

## (undated) DEVICE — ERBE NESSY®PLATE 170 SPLIT; 168CM²; CABLE 3M: Brand: ERBE